# Patient Record
Sex: MALE | Race: WHITE | Employment: FULL TIME | URBAN - NONMETROPOLITAN AREA
[De-identification: names, ages, dates, MRNs, and addresses within clinical notes are randomized per-mention and may not be internally consistent; named-entity substitution may affect disease eponyms.]

---

## 2017-11-24 ENCOUNTER — HOSPITAL ENCOUNTER (OUTPATIENT)
Dept: ULTRASOUND IMAGING | Age: 37
Discharge: HOME | End: 2017-11-24
Payer: COMMERCIAL

## 2017-11-24 DIAGNOSIS — R31.29 OTHER MICROSCOPIC HEMATURIA: ICD-10-CM

## 2017-11-24 PROCEDURE — 76770 US EXAM ABDO BACK WALL COMP: CPT

## 2018-02-09 ENCOUNTER — OFFICE VISIT (OUTPATIENT)
Dept: PODIATRY | Age: 38
End: 2018-02-09
Payer: COMMERCIAL

## 2018-02-09 ENCOUNTER — HOSPITAL ENCOUNTER (OUTPATIENT)
Dept: GENERAL RADIOLOGY | Age: 38
Discharge: HOME | End: 2018-02-11
Payer: COMMERCIAL

## 2018-02-09 VITALS
DIASTOLIC BLOOD PRESSURE: 72 MMHG | HEIGHT: 73 IN | SYSTOLIC BLOOD PRESSURE: 138 MMHG | HEART RATE: 74 BPM | BODY MASS INDEX: 41.75 KG/M2 | WEIGHT: 315 LBS

## 2018-02-09 DIAGNOSIS — M72.2 PLANTAR FASCIITIS OF RIGHT FOOT: ICD-10-CM

## 2018-02-09 DIAGNOSIS — M19.079 INFLAMMATION OF FOOT JOINT: ICD-10-CM

## 2018-02-09 DIAGNOSIS — M72.2 PLANTAR FASCIITIS OF RIGHT FOOT: Primary | ICD-10-CM

## 2018-02-09 PROCEDURE — 99203 OFFICE O/P NEW LOW 30 MIN: CPT | Performed by: PODIATRIST

## 2018-02-09 PROCEDURE — 73630 X-RAY EXAM OF FOOT: CPT

## 2018-02-09 PROCEDURE — L3040 FT ARCH SUPRT PREMOLD LONGIT: HCPCS | Performed by: PODIATRIST

## 2018-02-09 PROCEDURE — 20550 NJX 1 TENDON SHEATH/LIGAMENT: CPT | Performed by: PODIATRIST

## 2018-02-09 RX ORDER — BETAMETHASONE SODIUM PHOSPHATE AND BETAMETHASONE ACETATE 3; 3 MG/ML; MG/ML
6 INJECTION, SUSPENSION INTRA-ARTICULAR; INTRALESIONAL; INTRAMUSCULAR; SOFT TISSUE ONCE
Status: COMPLETED | OUTPATIENT
Start: 2018-02-09 | End: 2018-02-09

## 2018-02-09 RX ADMIN — BETAMETHASONE SODIUM PHOSPHATE AND BETAMETHASONE ACETATE 6 MG: 3; 3 INJECTION, SUSPENSION INTRA-ARTICULAR; INTRALESIONAL; INTRAMUSCULAR; SOFT TISSUE at 08:38

## 2018-02-09 NOTE — PROGRESS NOTES
procedure at age 16    TONSILLECTOMY         Family History   Problem Relation Age of Onset    Arthritis Mother     High Blood Pressure Mother     Arthritis Father     High Blood Pressure Father     High Cholesterol Father     Heart Disease Other     High Blood Pressure Brother     High Blood Pressure Brother     High Blood Pressure Paternal Grandmother     High Cholesterol Paternal Grandmother     Heart Disease Paternal Grandfather     High Blood Pressure Paternal Grandfather     Atrial Fibrillation Maternal Grandmother     Cancer Maternal Grandfather        Social History   Substance Use Topics    Smoking status: Never Smoker    Smokeless tobacco: Current User     Types: Chew    Alcohol use 1.5 oz/week     3 Standard drinks or equivalent per week      Comment: rarely       Review of Systems: All 12 systems reviewed and pertinent positives noted above. Lower Extremity Physical Examination:     Vitals:   Vitals:    02/09/18 0813   BP: (!) 148/70   Pulse: 74     General: AAO x 3 in NAD. Vascular: DP and PT pulses palpable 2/4, bilateral.  CFT <3 seconds, bilateral.  Hair growth present to the level of the digits, bilateral.  Edema absent, bilateral.  Varicosities absent, bilateral. Erythema absent, bilateral. Distal Rubor absent bilateral.  Temperature within normal limits bilateral. Hyperpigmentation absent bilateral. No atrophic skin. Neurological: Sensation intact to light touch to level of digits, bilateral.  Protective sensation intact 10/10 sites via 5.07/10g [place] Monofilament, bilateral.  negative Tinel's, bilateral.  negative Valleix sign, bilateral.  Vibratory intact bilateral.  Reflexes Decreased bilateral.  Paresthesias negative. Dysthesias negative. Sharp/dull intact bilateral.    Musculoskeletal: Muscle strength 5/5, Bilateral.  Pain present upon palpation of medial calcaneal tubercle, Right.   Pain lateral lisfran joinjt to dorsal R 4th metatarsal. within normal limits medial longitudinal arch, Bilateral.  Ankle ROM decreased,Bilateral.  1st MPJ ROM within normal limits, Bilateral.  Dorsally contracted digits absent digits , Bilateral. Other foot deformities none. Integument: Warm, dry, supple, bilateral.  Open lesion absent, bilateral.  Interdigital maceration absent to web spaces bilateral.  Nails within normal limits. Fissures absent, bilateral. Hyperkeratotic tissue is absent. Asessment: Patient is a 40 y.o. male with:   1. Plantar fasciitis of right foot  UT INJECT TENDON SHEATH/LIGAMENT    XR FOOT RIGHT (MIN 3 VIEWS)   2. Inflammation of foot joint  XR FOOT RIGHT (MIN 3 VIEWS)       Plan: Patient examined and evaluated. Current condition and treatment options discussed in detail. Patient was given plantar fasciitis instruction sheet. Patient will start stretching, icing, anti-inflammatory, and arch supports in shoe gear 100% of the time WB. Patient will not go barefoot. Verbal consent obtained from patient for Right heel injection after all questions answered. Right heel palpated medially and most tender location adjacent to the medial calcaneal tubercle identified. This area was prepped with an alcohol swab and 0.5 cc of 1%lidocaine plain and 1 cc of celestone was injected to the Right heel. A band-aid was applied, patient advised of possible local steroid reaction symptoms, and patient instructed to limit activities to this area for 24 hours. Orders Placed This Encounter   Medications    Elastic Bandages & Supports (ANKLE SPLINT/NIGHT AIRFORM) [facility]     Si Device by Does not apply route every evening Plantar fasciitis of right foot  (primary encounter diagnosis)      Dispense posterior night splint.      Dispense:  1 each     Refill:  0     Orders Placed This Encounter   Procedures    XR FOOT RIGHT (MIN 3 VIEWS)     Standing Status:   Future     Standing Expiration Date:   2/10/2019     Scheduling Instructions:      WB    UT INJECT TENDON SHEATH/LIGAMENT       Contact office with any questions/problems/concerns. Return to office in 3week(s).

## 2018-03-02 ENCOUNTER — TELEPHONE (OUTPATIENT)
Dept: PODIATRY | Age: 38
End: 2018-03-02

## 2018-03-30 DIAGNOSIS — M79.673 PAIN OF FOOT, UNSPECIFIED LATERALITY: Primary | ICD-10-CM

## 2018-03-30 PROCEDURE — L3040 FT ARCH SUPRT PREMOLD LONGIT: HCPCS | Performed by: PODIATRIST

## 2020-05-05 ENCOUNTER — HOSPITAL ENCOUNTER (OUTPATIENT)
Age: 40
Discharge: HOME | End: 2020-05-05
Payer: COMMERCIAL

## 2020-05-05 PROCEDURE — 36415 COLL VENOUS BLD VENIPUNCTURE: CPT

## 2020-05-05 PROCEDURE — 84132 ASSAY OF SERUM POTASSIUM: CPT

## 2020-05-06 LAB — POTASSIUM SERPL-SCNC: 3.4 MEQ/L (ref 3.5–5.2)

## 2020-12-07 ENCOUNTER — HOSPITAL ENCOUNTER (OUTPATIENT)
Dept: NON INVASIVE DIAGNOSTICS | Age: 40
Discharge: HOME | End: 2020-12-07
Payer: COMMERCIAL

## 2020-12-07 PROCEDURE — 93320 DOPPLER ECHO COMPLETE: CPT

## 2020-12-07 PROCEDURE — 93303 ECHO TRANSTHORACIC: CPT

## 2020-12-07 PROCEDURE — 93325 DOPPLER ECHO COLOR FLOW MAPG: CPT

## 2021-03-10 ENCOUNTER — HOSPITAL ENCOUNTER (OUTPATIENT)
Age: 41
Discharge: HOME | End: 2021-03-10
Payer: COMMERCIAL

## 2021-03-10 LAB
ANION GAP SERPL CALCULATED.3IONS-SCNC: 10 MEQ/L (ref 8–16)
BUN BLDV-MCNC: 22 MG/DL (ref 7–22)
CALCIUM SERPL-MCNC: 9.1 MG/DL (ref 8.5–10.5)
CHLORIDE BLD-SCNC: 98 MEQ/L (ref 98–111)
CO2: 25 MEQ/L (ref 23–33)
CREAT SERPL-MCNC: 1.4 MG/DL (ref 0.4–1.2)
GFR SERPL CREATININE-BSD FRML MDRD: 56 ML/MIN/1.73M2
GLUCOSE BLD-MCNC: 70 MG/DL (ref 70–108)
POTASSIUM SERPL-SCNC: 4 MEQ/L (ref 3.5–5.2)
SODIUM BLD-SCNC: 133 MEQ/L (ref 135–145)

## 2021-03-10 PROCEDURE — 36415 COLL VENOUS BLD VENIPUNCTURE: CPT

## 2021-03-10 PROCEDURE — 80048 BASIC METABOLIC PNL TOTAL CA: CPT

## 2021-04-12 ENCOUNTER — HOSPITAL ENCOUNTER (OUTPATIENT)
Dept: INTERVENTIONAL RADIOLOGY/VASCULAR | Age: 41
Discharge: HOME | End: 2021-04-12
Payer: COMMERCIAL

## 2021-04-12 ENCOUNTER — APPOINTMENT (OUTPATIENT)
Dept: ULTRASOUND IMAGING | Age: 41
End: 2021-04-12
Payer: COMMERCIAL

## 2021-04-12 DIAGNOSIS — I48.91 ATRIAL FIBRILLATION, UNSPECIFIED TYPE (HCC): ICD-10-CM

## 2021-04-12 DIAGNOSIS — S75.009D: ICD-10-CM

## 2021-04-12 DIAGNOSIS — I15.8 OTHER SECONDARY HYPERTENSION: ICD-10-CM

## 2021-04-12 PROCEDURE — 93925 LOWER EXTREMITY STUDY: CPT

## 2021-06-21 ENCOUNTER — HOSPITAL ENCOUNTER (OUTPATIENT)
Age: 41
Discharge: HOME | End: 2021-06-21
Payer: COMMERCIAL

## 2021-06-21 PROCEDURE — 93005 ELECTROCARDIOGRAM TRACING: CPT | Performed by: NURSE PRACTITIONER

## 2021-06-23 LAB
EKG ATRIAL RATE: 77 BPM
EKG P AXIS: 21 DEGREES
EKG P-R INTERVAL: 210 MS
EKG Q-T INTERVAL: 418 MS
EKG QRS DURATION: 160 MS
EKG QTC CALCULATION (BAZETT): 473 MS
EKG R AXIS: 85 DEGREES
EKG T AXIS: 27 DEGREES
EKG VENTRICULAR RATE: 77 BPM

## 2021-06-23 PROCEDURE — 93010 ELECTROCARDIOGRAM REPORT: CPT | Performed by: INTERNAL MEDICINE

## 2021-07-19 ENCOUNTER — HOSPITAL ENCOUNTER (OUTPATIENT)
Dept: NON INVASIVE DIAGNOSTICS | Age: 41
Discharge: HOME | End: 2021-07-19
Payer: COMMERCIAL

## 2021-07-19 DIAGNOSIS — Q23.1 BICUSPID AORTIC VALVE: ICD-10-CM

## 2021-07-19 DIAGNOSIS — I44.7 LEFT BUNDLE BRANCH BLOCK (LBBB): ICD-10-CM

## 2021-07-19 DIAGNOSIS — Q25.1 COARCTATION OF AORTA (PREDUCTAL) (POSTDUCTAL): ICD-10-CM

## 2021-07-19 DIAGNOSIS — Q23.0 CONGENITAL STENOSIS OF AORTIC VALVE: ICD-10-CM

## 2021-07-19 PROCEDURE — 6360000002 HC RX W HCPCS

## 2021-07-19 PROCEDURE — A9500 TC99M SESTAMIBI: HCPCS | Performed by: NURSE PRACTITIONER

## 2021-07-19 PROCEDURE — 93017 CV STRESS TEST TRACING ONLY: CPT | Performed by: INTERNAL MEDICINE

## 2021-07-19 PROCEDURE — 3430000000 HC RX DIAGNOSTIC RADIOPHARMACEUTICAL: Performed by: NURSE PRACTITIONER

## 2021-07-19 PROCEDURE — 78452 HT MUSCLE IMAGE SPECT MULT: CPT

## 2021-07-19 RX ADMIN — Medication 10.4 MILLICURIE: at 13:07

## 2021-07-19 RX ADMIN — Medication 34.4 MILLICURIE: at 14:09

## 2023-01-02 ENCOUNTER — HOSPITAL ENCOUNTER (INPATIENT)
Age: 43
LOS: 2 days | Discharge: HOME | DRG: 309 | End: 2023-01-04
Attending: EMERGENCY MEDICINE | Admitting: HOSPITALIST
Payer: COMMERCIAL

## 2023-01-02 ENCOUNTER — APPOINTMENT (OUTPATIENT)
Dept: GENERAL RADIOLOGY | Age: 43
DRG: 309 | End: 2023-01-02
Payer: COMMERCIAL

## 2023-01-02 DIAGNOSIS — I48.91 ATRIAL FIBRILLATION WITH RVR (HCC): ICD-10-CM

## 2023-01-02 DIAGNOSIS — E11.9 TYPE 2 DIABETES MELLITUS WITHOUT COMPLICATION, WITHOUT LONG-TERM CURRENT USE OF INSULIN (HCC): ICD-10-CM

## 2023-01-02 DIAGNOSIS — I48.91 ATRIAL FIBRILLATION WITH RAPID VENTRICULAR RESPONSE (HCC): Primary | ICD-10-CM

## 2023-01-02 LAB
ANION GAP SERPL CALCULATED.3IONS-SCNC: 16 MEQ/L (ref 8–16)
AVERAGE GLUCOSE: 213 MG/DL (ref 70–126)
BACTERIA: ABNORMAL /HPF
BASOPHILS # BLD: 0.4 %
BASOPHILS ABSOLUTE: 0 THOU/MM3 (ref 0–0.1)
BETA-HYDROXYBUTYRATE: 1.65 MG/DL (ref 0.2–2.81)
BILIRUBIN URINE: NEGATIVE
BLOOD, URINE: ABNORMAL
BUN BLDV-MCNC: 22 MG/DL (ref 7–22)
CALCIUM SERPL-MCNC: 9 MG/DL (ref 8.5–10.5)
CASTS 2: ABNORMAL /LPF
CASTS UA: ABNORMAL /LPF
CHARACTER, URINE: CLEAR
CHLORIDE BLD-SCNC: 92 MEQ/L (ref 98–111)
CO2: 19 MEQ/L (ref 23–33)
COLOR: YELLOW
CREAT SERPL-MCNC: 1.2 MG/DL (ref 0.4–1.2)
CRYSTALS, UA: ABNORMAL
EKG Q-T INTERVAL: 330 MS
EKG QRS DURATION: 142 MS
EKG QTC CALCULATION (BAZETT): 510 MS
EKG R AXIS: 93 DEGREES
EKG T AXIS: -64 DEGREES
EKG VENTRICULAR RATE: 144 BPM
EOSINOPHIL # BLD: 1 %
EOSINOPHILS ABSOLUTE: 0.1 THOU/MM3 (ref 0–0.4)
EPITHELIAL CELLS, UA: ABNORMAL /HPF
ERYTHROCYTE [DISTWIDTH] IN BLOOD BY AUTOMATED COUNT: 13.2 % (ref 11.5–14.5)
ERYTHROCYTE [DISTWIDTH] IN BLOOD BY AUTOMATED COUNT: 42.1 FL (ref 35–45)
GFR SERPL CREATININE-BSD FRML MDRD: > 60 ML/MIN/1.73M2
GLUCOSE BLD-MCNC: 268 MG/DL (ref 70–108)
GLUCOSE BLD-MCNC: 272 MG/DL (ref 70–108)
GLUCOSE BLD-MCNC: 447 MG/DL (ref 70–108)
GLUCOSE URINE: >= 1000 MG/DL
HBA1C MFR BLD: 9.1 % (ref 4.4–6.4)
HCT VFR BLD CALC: 40.1 % (ref 42–52)
HEMOGLOBIN: 14 GM/DL (ref 14–18)
IMMATURE GRANS (ABS): 0.05 THOU/MM3 (ref 0–0.07)
IMMATURE GRANULOCYTES: 0.4 %
INFLUENZA A: NOT DETECTED
INFLUENZA B: NOT DETECTED
INR BLD: 1.24 (ref 0.85–1.13)
KETONES, URINE: NEGATIVE
LEUKOCYTE ESTERASE, URINE: NEGATIVE
LYMPHOCYTES # BLD: 9.8 %
LYMPHOCYTES ABSOLUTE: 1.2 THOU/MM3 (ref 1–4.8)
MAGNESIUM: 1.7 MG/DL (ref 1.6–2.4)
MCH RBC QN AUTO: 30.8 PG (ref 26–33)
MCHC RBC AUTO-ENTMCNC: 34.9 GM/DL (ref 32.2–35.5)
MCV RBC AUTO: 88.1 FL (ref 80–94)
MISCELLANEOUS 2: ABNORMAL
MONOCYTES # BLD: 4.7 %
MONOCYTES ABSOLUTE: 0.6 THOU/MM3 (ref 0.4–1.3)
NITRITE, URINE: NEGATIVE
NUCLEATED RED BLOOD CELLS: 0 /100 WBC
OSMOLALITY CALCULATION: 277.9 MOSMOL/KG (ref 275–300)
PH UA: 5.5 (ref 5–9)
PLATELET # BLD: 273 THOU/MM3 (ref 130–400)
PMV BLD AUTO: 9.3 FL (ref 9.4–12.4)
POTASSIUM SERPL-SCNC: 4.7 MEQ/L (ref 3.5–5.2)
PRO-BNP: 3137 PG/ML (ref 0–124)
PROCALCITONIN: 0.16 NG/ML (ref 0.01–0.09)
PROTEIN UA: NEGATIVE
RBC # BLD: 4.55 MILL/MM3 (ref 4.7–6.1)
RBC URINE: ABNORMAL /HPF
RENAL EPITHELIAL, UA: ABNORMAL
SARS-COV-2 RNA, RT PCR: NOT DETECTED
SEG NEUTROPHILS: 83.7 %
SEGMENTED NEUTROPHILS ABSOLUTE COUNT: 10 THOU/MM3 (ref 1.8–7.7)
SODIUM BLD-SCNC: 127 MEQ/L (ref 135–145)
SPECIFIC GRAVITY, URINE: 1.02 (ref 1–1.03)
TROPONIN T: < 0.01 NG/ML
TSH SERPL DL<=0.05 MIU/L-ACNC: 1.65 UIU/ML (ref 0.4–4.2)
UROBILINOGEN, URINE: 0.2 EU/DL (ref 0–1)
WBC # BLD: 11.9 THOU/MM3 (ref 4.8–10.8)
WBC UA: ABNORMAL /HPF
YEAST: ABNORMAL

## 2023-01-02 PROCEDURE — 96361 HYDRATE IV INFUSION ADD-ON: CPT

## 2023-01-02 PROCEDURE — 84443 ASSAY THYROID STIM HORMONE: CPT

## 2023-01-02 PROCEDURE — 2140000000 HC CCU INTERMEDIATE R&B

## 2023-01-02 PROCEDURE — 83735 ASSAY OF MAGNESIUM: CPT

## 2023-01-02 PROCEDURE — 87636 SARSCOV2 & INF A&B AMP PRB: CPT

## 2023-01-02 PROCEDURE — 2500000003 HC RX 250 WO HCPCS: Performed by: STUDENT IN AN ORGANIZED HEALTH CARE EDUCATION/TRAINING PROGRAM

## 2023-01-02 PROCEDURE — 84145 PROCALCITONIN (PCT): CPT

## 2023-01-02 PROCEDURE — 93005 ELECTROCARDIOGRAM TRACING: CPT | Performed by: STUDENT IN AN ORGANIZED HEALTH CARE EDUCATION/TRAINING PROGRAM

## 2023-01-02 PROCEDURE — 6370000000 HC RX 637 (ALT 250 FOR IP)

## 2023-01-02 PROCEDURE — 80048 BASIC METABOLIC PNL TOTAL CA: CPT

## 2023-01-02 PROCEDURE — 84484 ASSAY OF TROPONIN QUANT: CPT

## 2023-01-02 PROCEDURE — 2580000003 HC RX 258: Performed by: STUDENT IN AN ORGANIZED HEALTH CARE EDUCATION/TRAINING PROGRAM

## 2023-01-02 PROCEDURE — 82010 KETONE BODYS QUAN: CPT

## 2023-01-02 PROCEDURE — 71046 X-RAY EXAM CHEST 2 VIEWS: CPT

## 2023-01-02 PROCEDURE — 99285 EMERGENCY DEPT VISIT HI MDM: CPT

## 2023-01-02 PROCEDURE — 82948 REAGENT STRIP/BLOOD GLUCOSE: CPT

## 2023-01-02 PROCEDURE — 83036 HEMOGLOBIN GLYCOSYLATED A1C: CPT

## 2023-01-02 PROCEDURE — 81001 URINALYSIS AUTO W/SCOPE: CPT

## 2023-01-02 PROCEDURE — 85025 COMPLETE CBC W/AUTO DIFF WBC: CPT

## 2023-01-02 PROCEDURE — 6360000002 HC RX W HCPCS

## 2023-01-02 PROCEDURE — 2580000003 HC RX 258

## 2023-01-02 PROCEDURE — 96365 THER/PROPH/DIAG IV INF INIT: CPT

## 2023-01-02 PROCEDURE — 85610 PROTHROMBIN TIME: CPT

## 2023-01-02 PROCEDURE — 6370000000 HC RX 637 (ALT 250 FOR IP): Performed by: PHYSICIAN ASSISTANT

## 2023-01-02 PROCEDURE — 36415 COLL VENOUS BLD VENIPUNCTURE: CPT

## 2023-01-02 PROCEDURE — 96376 TX/PRO/DX INJ SAME DRUG ADON: CPT

## 2023-01-02 PROCEDURE — 83880 ASSAY OF NATRIURETIC PEPTIDE: CPT

## 2023-01-02 RX ORDER — SODIUM CHLORIDE 0.9 % (FLUSH) 0.9 %
5-40 SYRINGE (ML) INJECTION EVERY 12 HOURS SCHEDULED
Status: DISCONTINUED | OUTPATIENT
Start: 2023-01-02 | End: 2023-01-04 | Stop reason: HOSPADM

## 2023-01-02 RX ORDER — INSULIN LISPRO 100 [IU]/ML
0-8 INJECTION, SOLUTION INTRAVENOUS; SUBCUTANEOUS
Status: DISCONTINUED | OUTPATIENT
Start: 2023-01-02 | End: 2023-01-04 | Stop reason: HOSPADM

## 2023-01-02 RX ORDER — DILTIAZEM HYDROCHLORIDE 5 MG/ML
10 INJECTION INTRAVENOUS ONCE
Status: COMPLETED | OUTPATIENT
Start: 2023-01-02 | End: 2023-01-02

## 2023-01-02 RX ORDER — POTASSIUM CHLORIDE 20 MEQ/1
40 TABLET, EXTENDED RELEASE ORAL PRN
Status: DISCONTINUED | OUTPATIENT
Start: 2023-01-02 | End: 2023-01-04 | Stop reason: HOSPADM

## 2023-01-02 RX ORDER — METOPROLOL SUCCINATE 100 MG/1
100 TABLET, EXTENDED RELEASE ORAL 2 TIMES DAILY
Status: DISCONTINUED | OUTPATIENT
Start: 2023-01-02 | End: 2023-01-03

## 2023-01-02 RX ORDER — ACETAMINOPHEN 650 MG/1
650 SUPPOSITORY RECTAL EVERY 6 HOURS PRN
Status: DISCONTINUED | OUTPATIENT
Start: 2023-01-02 | End: 2023-01-04 | Stop reason: HOSPADM

## 2023-01-02 RX ORDER — ONDANSETRON 4 MG/1
4 TABLET, ORALLY DISINTEGRATING ORAL EVERY 8 HOURS PRN
Status: DISCONTINUED | OUTPATIENT
Start: 2023-01-02 | End: 2023-01-04 | Stop reason: HOSPADM

## 2023-01-02 RX ORDER — SODIUM CHLORIDE 9 MG/ML
INJECTION, SOLUTION INTRAVENOUS CONTINUOUS
Status: ACTIVE | OUTPATIENT
Start: 2023-01-02 | End: 2023-01-03

## 2023-01-02 RX ORDER — INSULIN LISPRO 100 [IU]/ML
0-4 INJECTION, SOLUTION INTRAVENOUS; SUBCUTANEOUS NIGHTLY
Status: DISCONTINUED | OUTPATIENT
Start: 2023-01-02 | End: 2023-01-02

## 2023-01-02 RX ORDER — ACETAMINOPHEN 325 MG/1
650 TABLET ORAL EVERY 6 HOURS PRN
Status: DISCONTINUED | OUTPATIENT
Start: 2023-01-02 | End: 2023-01-04 | Stop reason: HOSPADM

## 2023-01-02 RX ORDER — LISINOPRIL 40 MG/1
40 TABLET ORAL DAILY
Status: CANCELLED | OUTPATIENT
Start: 2023-01-02

## 2023-01-02 RX ORDER — LISINOPRIL 40 MG/1
40 TABLET ORAL DAILY
Status: DISCONTINUED | OUTPATIENT
Start: 2023-01-02 | End: 2023-01-04 | Stop reason: HOSPADM

## 2023-01-02 RX ORDER — SPIRONOLACTONE 25 MG/1
25 TABLET ORAL DAILY
Status: DISCONTINUED | OUTPATIENT
Start: 2023-01-02 | End: 2023-01-04 | Stop reason: HOSPADM

## 2023-01-02 RX ORDER — SPIRONOLACTONE 25 MG/1
25 TABLET ORAL DAILY
COMMUNITY

## 2023-01-02 RX ORDER — INSULIN LISPRO 100 [IU]/ML
0-4 INJECTION, SOLUTION INTRAVENOUS; SUBCUTANEOUS NIGHTLY
Status: DISCONTINUED | OUTPATIENT
Start: 2023-01-02 | End: 2023-01-04 | Stop reason: HOSPADM

## 2023-01-02 RX ORDER — INSULIN LISPRO 100 [IU]/ML
0-4 INJECTION, SOLUTION INTRAVENOUS; SUBCUTANEOUS
Status: DISCONTINUED | OUTPATIENT
Start: 2023-01-02 | End: 2023-01-02

## 2023-01-02 RX ORDER — MAGNESIUM SULFATE 1 G/100ML
1000 INJECTION INTRAVENOUS ONCE
Status: COMPLETED | OUTPATIENT
Start: 2023-01-02 | End: 2023-01-02

## 2023-01-02 RX ORDER — SODIUM CHLORIDE 9 MG/ML
INJECTION, SOLUTION INTRAVENOUS PRN
Status: DISCONTINUED | OUTPATIENT
Start: 2023-01-02 | End: 2023-01-04 | Stop reason: HOSPADM

## 2023-01-02 RX ORDER — 0.9 % SODIUM CHLORIDE 0.9 %
1000 INTRAVENOUS SOLUTION INTRAVENOUS ONCE
Status: COMPLETED | OUTPATIENT
Start: 2023-01-02 | End: 2023-01-02

## 2023-01-02 RX ORDER — POTASSIUM CHLORIDE 7.45 MG/ML
10 INJECTION INTRAVENOUS PRN
Status: DISCONTINUED | OUTPATIENT
Start: 2023-01-02 | End: 2023-01-04 | Stop reason: HOSPADM

## 2023-01-02 RX ORDER — SPIRONOLACTONE 25 MG/1
25 TABLET ORAL DAILY
Status: CANCELLED | OUTPATIENT
Start: 2023-01-02

## 2023-01-02 RX ORDER — ONDANSETRON 2 MG/ML
4 INJECTION INTRAMUSCULAR; INTRAVENOUS EVERY 6 HOURS PRN
Status: DISCONTINUED | OUTPATIENT
Start: 2023-01-02 | End: 2023-01-04 | Stop reason: HOSPADM

## 2023-01-02 RX ORDER — ASPIRIN 81 MG/1
81 TABLET ORAL DAILY
Status: DISCONTINUED | OUTPATIENT
Start: 2023-01-03 | End: 2023-01-04 | Stop reason: HOSPADM

## 2023-01-02 RX ORDER — MAGNESIUM SULFATE IN WATER 40 MG/ML
2000 INJECTION, SOLUTION INTRAVENOUS PRN
Status: DISCONTINUED | OUTPATIENT
Start: 2023-01-02 | End: 2023-01-04 | Stop reason: HOSPADM

## 2023-01-02 RX ORDER — INSULIN GLARGINE 100 [IU]/ML
10 INJECTION, SOLUTION SUBCUTANEOUS NIGHTLY
Status: DISCONTINUED | OUTPATIENT
Start: 2023-01-02 | End: 2023-01-04 | Stop reason: HOSPADM

## 2023-01-02 RX ORDER — DEXTROSE MONOHYDRATE 100 MG/ML
INJECTION, SOLUTION INTRAVENOUS CONTINUOUS PRN
Status: DISCONTINUED | OUTPATIENT
Start: 2023-01-02 | End: 2023-01-04 | Stop reason: HOSPADM

## 2023-01-02 RX ORDER — METOPROLOL SUCCINATE 100 MG/1
100 TABLET, EXTENDED RELEASE ORAL 2 TIMES DAILY
Status: CANCELLED | OUTPATIENT
Start: 2023-01-02

## 2023-01-02 RX ORDER — POLYETHYLENE GLYCOL 3350 17 G/17G
17 POWDER, FOR SOLUTION ORAL DAILY PRN
Status: DISCONTINUED | OUTPATIENT
Start: 2023-01-02 | End: 2023-01-04 | Stop reason: HOSPADM

## 2023-01-02 RX ORDER — SODIUM CHLORIDE 0.9 % (FLUSH) 0.9 %
5-40 SYRINGE (ML) INJECTION PRN
Status: DISCONTINUED | OUTPATIENT
Start: 2023-01-02 | End: 2023-01-04 | Stop reason: HOSPADM

## 2023-01-02 RX ADMIN — DILTIAZEM HYDROCHLORIDE 10 MG: 5 INJECTION INTRAVENOUS at 12:59

## 2023-01-02 RX ADMIN — DILTIAZEM HYDROCHLORIDE 5 MG/HR: 5 INJECTION, SOLUTION INTRAVENOUS at 13:04

## 2023-01-02 RX ADMIN — DILTIAZEM HYDROCHLORIDE 30 MG: 30 TABLET, FILM COATED ORAL at 18:53

## 2023-01-02 RX ADMIN — SODIUM CHLORIDE 1000 ML: 9 INJECTION, SOLUTION INTRAVENOUS at 11:14

## 2023-01-02 RX ADMIN — INSULIN GLARGINE 10 UNITS: 100 INJECTION, SOLUTION SUBCUTANEOUS at 21:17

## 2023-01-02 RX ADMIN — INSULIN LISPRO 4 UNITS: 100 INJECTION, SOLUTION INTRAVENOUS; SUBCUTANEOUS at 18:53

## 2023-01-02 RX ADMIN — SODIUM CHLORIDE: 9 INJECTION, SOLUTION INTRAVENOUS at 14:54

## 2023-01-02 RX ADMIN — DILTIAZEM HYDROCHLORIDE 15 MG/HR: 5 INJECTION, SOLUTION INTRAVENOUS at 22:33

## 2023-01-02 RX ADMIN — APIXABAN 5 MG: 5 TABLET, FILM COATED ORAL at 19:49

## 2023-01-02 RX ADMIN — METOPROLOL SUCCINATE 100 MG: 100 TABLET, EXTENDED RELEASE ORAL at 19:48

## 2023-01-02 RX ADMIN — DILTIAZEM HYDROCHLORIDE 10 MG: 5 INJECTION INTRAVENOUS at 11:14

## 2023-01-02 RX ADMIN — MAGNESIUM SULFATE HEPTAHYDRATE 1000 MG: 1 INJECTION, SOLUTION INTRAVENOUS at 19:46

## 2023-01-02 ASSESSMENT — HEART SCORE: ECG: 1

## 2023-01-02 ASSESSMENT — PAIN - FUNCTIONAL ASSESSMENT
PAIN_FUNCTIONAL_ASSESSMENT: NONE - DENIES PAIN

## 2023-01-02 NOTE — ED PROVIDER NOTES
I performed a history and physical examination of the patient and discussed management with the resident. I reviewed the residents note and agree with the documented findings and plan of care. Any areas of disagreement are noted on the chart. I was personally present for the key portions of any procedures. I have documented in the chart those procedures where I was not present during the key portions. I have reviewed the emergency nurses triage note. I agree with the chief complaint, past medical history, past surgical history, allergies, medications, social and family history as documented unless otherwise noted below. Documentation of the HPI, Physical Exam and Medical Decision Making performed by medical students or scribes is based on my personal performance of the HPI, PE and MDM. For Phys Assistant/ Nurse Practitioner cases/documentation I have personally evaluated this patient and have completed at least one if not all key elements of the E/M (history, physical exam, and MDM). My findings are as noted below     Patient presents today for tachycardia with heart rate. He stated he felt fatigued yesterday. Patient does have a history of atrial fibrillation. He was started on Eliquis yesterday. Patient stated he had some minor chest pain yesterday had cold sweats. Today he denies any chest pain or shortness of breath. Atrial fibrillation rapid ventricular response right axis deviation, left bundle branch block, ventricular rate of 144 NV interval indeterminate QRS duration 142 QT interval 330 QTC of 510. XR CHEST (2 VW)   Final Result   1. Stable cardiomegaly. 2. No acute findings. **This report has been created using voice recognition software. It may contain minor errors which are inherent in voice recognition technology. **      Final report electronically signed by Dr. Madison Zhou on 1/2/2023 11:54 AM        Labs Reviewed   CBC WITH AUTO DIFFERENTIAL - Abnormal; Notable for the following components:       Result Value    WBC 11.9 (*)     RBC 4.55 (*)     Hematocrit 40.1 (*)     MPV 9.3 (*)     Segs Absolute 10.0 (*)     All other components within normal limits   BASIC METABOLIC PANEL - Abnormal; Notable for the following components:    Sodium 127 (*)     Chloride 92 (*)     CO2 19 (*)     Glucose 447 (*)     All other components within normal limits   BRAIN NATRIURETIC PEPTIDE - Abnormal; Notable for the following components:    Pro-BNP 3137.0 (*)     All other components within normal limits   PROTIME-INR - Abnormal; Notable for the following components:    INR 1.24 (*)     All other components within normal limits   PROCALCITONIN - Abnormal; Notable for the following components:    Procalcitonin 0.16 (*)     All other components within normal limits   URINE WITH REFLEXED MICRO - Abnormal; Notable for the following components:    Glucose, Ur >= 1000 (*)     Blood, Urine SMALL (*)     All other components within normal limits   HEMOGLOBIN A1C - Abnormal; Notable for the following components:    Hemoglobin A1C 9.1 (*)     AVERAGE GLUCOSE 213 (*)     All other components within normal limits   POCT GLUCOSE - Abnormal; Notable for the following components:    POC Glucose 272 (*)     All other components within normal limits   COVID-19 & INFLUENZA COMBO   TSH   MAGNESIUM   TROPONIN   ANION GAP   OSMOLALITY   GLOMERULAR FILTRATION RATE, ESTIMATED   BETA-HYDROXYBUTYRATE   CBC WITH AUTO DIFFERENTIAL   BASIC METABOLIC PANEL W/ REFLEX TO MG FOR LOW K   MAGNESIUM   POCT GLUCOSE   POCT GLUCOSE         Final diagnoses:   Atrial fibrillation with rapid ventricular response (HCC)   Type 2 diabetes mellitus without complication, without long-term current use of insulin ([facility] [address]   . I have seen this patient with Dr. Landen Marino the resident and agree with her assessment and plan.      Cassidy Hilario DO  01/02/23 2003

## 2023-01-02 NOTE — H&P
Hospitalist History and Physical          Patient: Brooks Pineda  : 1980  MRN: [id number]     Acct: [de-identified]    PCP: ELENA Grant CNP  Date of Admission: 2023  Date of Service: Pt seen/examined on 23  and Admitted to Inpatient with expected LOS greater than two midnights due to medical therapy. Hospital Problems             Last Modified POA    * (Principal) Atrial fibrillation with RVR ([address] 2023 Yes       Assessment and Plan:    Paroxysmal Atrial fibrillation with RVR:   Patient is s/p ablation 2 years ago. Anticoagulated on Eliquis. EKG showed A-fib with RVR. Started on diltiazem gtt in ED, continued. On metoprolol and diltiazem 30mg BID at home. Will resume his home metoprolol and add po diltiazem 30mg Q6h, wean gtt off as able. Keep K > 4, Mg >2   Giving 1 g mag sulfate now. Hyponatremia, mild: 127. When corrected for BG of 447 Na is 133. Patient already received 1 L bolus. Will start gentle hydration. Hypertension: Patient currently is currently on the softer side at this time. Will resume his rate controlling medications as above. Hold lisinopril/hctz, resume when appropriate. NIDDMII: Newly diagnosed  HGB A1C: 9.6. ,   Will start Lantus 10U nightly plus medium dose SSI. Monitor insulin requirements and adjust as indicated. Hypoglycemia protocol, carb control diet, consult placed for dietician for diabetes education. Non-anion gap metabolic acidosis: Currently bicarb is only 19. Received IVF bolus, continuing gentle hydration. Repeat BMP in AM.    H/o AS, s/p Ross procedure . 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  Follows with [facility] in outpatient setting.        =======================================================================      Chief Complaint:  Lethargy/fast heart rate    History Of Present Illness:  Brooks Pineda is a 43 y.o. male with PMHx of Paroxysmal A-Fib, hypertension, and heart murmur who presents to [facility] with lethargy/fast heart rate. Patient stated that his heart rate was noted to be elevated yesterday, which this has occurred in the past with spontaneous resolution. He notified his sister, whom works with cardiology group with [facility], that helped him get a prescription for eliquis in case the A-fib continued. He also mentioned several heart surgeries growing up with his last one occurring at the age of 16. He denies chest pain, shortness of breath, fever, chills, rigors and having any sick contacts. He did endorse having sweating episodes yesterday, but not so bad today. ED course: While in the ED he received a 1 L bolus of NS, EKG performed showed A-fib with RVR. Cardizem drip was initiated and blood pressure has been maintaining. Past Medical History:        Diagnosis Date    Atrial fibrillation with RVR ([facility] [address]     Coarctation of aorta 1980    surgery at birth for this    H/O Ross procedure 1997    Heart murmur     History of blood transfusion     Hypertension     Seizures ([address]        Past Surgical History:        Procedure Laterality Date    ADENOIDECTOMY      CARDIAC CATHETERIZATION      2 caths    CARDIAC SURGERY  1980    aortic stenosis & coarctation repair    CARDIAC VALVE REPLACEMENT  1997    ross procedure at age 16    TONSILLECTOMY         Medications Prior to Admission:   Prior to Admission medications    Medication Sig Start Date End Date Taking?  Authorizing Provider   apixaban (ELIQUIS) 5 MG TABS tablet Take 5 mg by mouth 2 times daily   Yes Historical Provider, MD   dilTIAZem (CARDIZEM) 30 MG tablet Take 30 mg by mouth 2 times daily   Yes Historical Provider, MD   spironolactone (ALDACTONE) 25 MG tablet Take 25 mg by mouth daily   Yes Historical Provider, MD   Elastic Bandages & Supports (ANKLE SPLINT/NIGHT AIRFORM) MISC 1 Device by Does not apply route every evening Plantar fasciitis of right foot  (primary encounter diagnosis)      Dispense posterior night splint. 2/9/18   Ebenezer Garcia DPM   metoprolol (TOPROL-XL) 100 MG XL tablet Take 100 mg by mouth 2 times daily    Historical Provider, MD   lisinopril (PRINIVIL;ZESTRIL) 40 MG tablet Take 40 mg by mouth daily    Historical Provider, MD   aspirin 81 MG tablet Take 81 mg by mouth daily    Historical Provider, MD       Allergies:  Patient has no known allergies. Social History:    The patient currently lives at home. Tobacco use:   reports that he has never smoked. His smokeless tobacco use includes chew. Alcohol use:   reports current alcohol use of about 2.5 standard drinks per week. Drug use:  reports no history of drug use. Family History:   as follows:      Problem Relation Age of Onset    Arthritis Mother     High Blood Pressure Mother     Arthritis Father     High Blood Pressure Father     High Cholesterol Father     Heart Disease Other     High Blood Pressure Brother     High Blood Pressure Brother     High Blood Pressure Paternal Grandmother     High Cholesterol Paternal Grandmother     Heart Disease Paternal Grandfather     High Blood Pressure Paternal Grandfather     Atrial Fibrillation Maternal Grandmother     Cancer Maternal Grandfather        Review of Systems:   Pertinent positives and negatives as noted in the HPI. Otherwise complete ROS negative. Physical Exam:    BP 92/60   Pulse (!) 133   Temp 98 °F (36.7 °C)   Resp 16   Ht 6' 1\" (1.854 m)   Wt (!) 340 lb (154.2 kg)   SpO2 98%   BMI 44.86 kg/m²       General appearance: No apparent distress, appears stated age. Eyes:  Pupils equal, round, and reactive to light. Conjunctivae/corneas clear. HENT: Head normal in appearance. External nares normal.  Oral mucosa moist without lesions. Hearing grossly intact. Neck: Supple, with full range of motion. Trachea midline. No gross JVD appreciated. Respiratory:  Normal respiratory effort.  Clear to auscultation, bilaterally without rales or wheezes or rhonchi. Cardiovascular: Irregularly irregular rhythm with Z7/N7 systolic murmur. No lower extremity edema. Abdomen: Soft, non-tender, non-distended with normal bowel sounds. Musculoskeletal: No joint swelling or tenderness. Normal tone. No abnormal movements. Skin: Warm and dry. No rashes or lesions. Neurologic:  No focal sensory/motor deficits in the upper and lower extremities. Cranial nerves:  grossly non-focal 2-12. Psychiatric: Alert and oriented, normal insight and thought content. Capillary Refill: Brisk,< 3 seconds. Peripheral Pulses: +2 palpable, equal bilaterally. Labs:     Recent Labs     01/02/23  1100   WBC 11.9*   HGB 14.0   HCT 40.1*        Recent Labs     01/02/23  1100   *   K 4.7   CL 92*   CO2 19*   BUN 22   CREATININE 1.2   CALCIUM 9.0     No results for input(s): AST, ALT, BILIDIR, BILITOT, ALKPHOS in the last 72 hours. Recent Labs     01/02/23  1100   INR 1.24*     No results for input(s): Magdalene Cohen in the last 72 hours. Lab Results   Component Value Date/Time    NITRU NEGATIVE 01/02/2023 12:30 PM    WBCUA NONE SEEN 01/02/2023 12:30 PM    BACTERIA NONE SEEN 01/02/2023 12:30 PM    RBCUA 0-2 01/02/2023 12:30 PM    BLOODU SMALL 01/02/2023 12:30 PM    GLUCOSEU >= 1000 01/02/2023 12:30 PM         Radiology:     XR CHEST (2 VW)   Final Result   1. Stable cardiomegaly. 2. No acute findings. **This report has been created using voice recognition software. It may contain minor errors which are inherent in voice recognition technology. **      Final report electronically signed by Dr. Loreta Fonseca on 1/2/2023 11:54 AM             EKG:  I have reviewed the EKG with the following interpretation: A-fib with RVR      PT/OT Eval Status:  will be assessed  Diet: Carb control 5 carbs/meal  DVT prophylaxis: Currently on medication  Code Status: FC  Disposition: admit to Stepdown     Thank you Roberto Bowles, APRN - STACY for the opportunity to be involved in this patient's care.     Electronically signed by ELENA Truong CNP on 1/2/2023 at 1:54 PM.

## 2023-01-02 NOTE — ED NOTES
ED to inpatient nurses report    Chief Complaint   Patient presents with    Tachycardia    Chest Pain      Present to ED from home  LOC: alert and orientated to name, place, date  Vital signs   Vitals:    01/02/23 1207 01/02/23 1304 01/02/23 1411 01/02/23 1452   BP: (!) 111/57 92/60 100/62 95/60   Pulse: (!) 140 (!) 133 (!) 145 (!) 141   Resp: 18 16 16 16   Temp:       SpO2: 100% 98% 98% 98%   Weight:       Height:          Oxygen Baseline 98%    Current needs required RA Bipap/Cpap No  LDAs:   Peripheral IV 01/02/23 Right Forearm (Active)   Site Assessment Clean, dry & intact 01/02/23 1111   Line Status Blood return noted; Flushed;Specimen collected 01/02/23 1111   Phlebitis Assessment No symptoms 01/02/23 1111   Infiltration Assessment 0 01/02/23 1111   Dressing Status Clean, dry & intact 01/02/23 1111     Mobility: Independent  Pending ED orders: NA  Present condition: stable    C-SSRS Risk of Suicide: No Risk  Swallow Screening    Preferred Language: [place]     Electronically signed by Francisco Westfall RN on 1/2/2023 at 3:35 PM       Francisco Westfall RN  01/02/23 4622

## 2023-01-02 NOTE — FLOWSHEET NOTE
Patient arrived per cart to 3B. Heart monitor applied and vitals taken. Admission paperwork completed. Explained to patient that [facility] is not responsible for any lost or stolen items. Patient verbalized understanding. Oriented to room and use of call light and bed controls. Patient denies pain or needs. No signs of distress noted. Bed locked & in low position, side-rails up x2. Call light in reach. Reminded patient to call nurse if any needs arise. 2 person skin assessment performed by this nurse and Garcia Lyons RN. Explained patients right to have family, representative or physician notified of their admission. Patient has Declined for physician to be notified. Patient has Declined for family/representative to be notified.

## 2023-01-02 NOTE — ED NOTES
Pt transported to [facility]. Pt in stable condition. Floor contacted before transport, spoke to isac.       Efe Savage  01/02/23 1933

## 2023-01-02 NOTE — ED PROVIDER NOTES
[address] [facility] [address] [address] EMERGENCY DEPT      EMERGENCY MEDICINE     Pt Name: Tammy Philip  MRN: [id number]  [place] 1980  Date of evaluation: 1/2/2023  Provider: Kevin Marquez MD  Supervising Physician: Rancho Warren       Chief Complaint   Patient presents with    Tachycardia    Chest Pain     History obtained from the patient. HISTORY OF PRESENT ILLNESS   Tammy Philip is a pleasant 43 y.o. male who presents to the emergency department from from home, by private vehicle for evaluation of tachycardia. 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 fib status post cardioversion and ablation 2 years ago, started on Eliquis yesterday, presenting with concern for recurrent A. fib. States that yesterday he was sitting his chair, noticed that he was sweating, checked his heart rate it was elevated, he felt that this likely was A. fib. Has not had any chest pain or shortness of breath or leg swelling. Patient is chronically on several medications managed by different physicians, had been started on spironolactone to improve his potassium, however stopped taking hydrochlorothiazide since then, still persisting spironolactone. His cardiologist is at [facility], since his previous history of congenital heart disease. Has not established a local cardiologist.  Denies any dizziness, change in vision, fever, chills.     PASTMEDICAL HISTORY     Past Medical History:   Diagnosis Date    Atrial fibrillation with RVR ([address]     Coarctation of aorta 1980    surgery at birth for this    H/O Ross procedure 1997    Heart murmur     History of blood transfusion     Hypertension     Seizures [facility])        Patient Active Problem List   Diagnosis Code    Atrial fibrillation with rapid ventricular response ([address] I48.91    Persistent atrial fibrillation ([address] I48.19     SURGICAL HISTORY       Past Surgical History: Procedure Laterality Date    ADENOIDECTOMY      CARDIAC CATHETERIZATION      2 caths    CARDIAC SURGERY  1980    aortic stenosis & coarctation repair    CARDIAC VALVE REPLACEMENT  1997    ross procedure at age 16    TONSILLECTOMY         CURRENT MEDICATIONS       Previous Medications    APIXABAN (ELIQUIS) 5 MG TABS TABLET    Take 5 mg by mouth 2 times daily    ASPIRIN 81 MG TABLET    Take 81 mg by mouth daily    DILTIAZEM (CARDIZEM) 30 MG TABLET    Take 30 mg by mouth 2 times daily    ELASTIC BANDAGES & SUPPORTS (ANKLE SPLINT/NIGHT AIRFORM) MISC    1 Device by Does not apply route every evening Plantar fasciitis of right foot  (primary encounter diagnosis)      Dispense posterior night splint. LISINOPRIL (PRINIVIL;ZESTRIL) 40 MG TABLET    Take 40 mg by mouth daily    METOPROLOL (TOPROL-XL) 100 MG XL TABLET    Take 100 mg by mouth 2 times daily    SPIRONOLACTONE (ALDACTONE) 25 MG TABLET    Take 25 mg by mouth daily       ALLERGIES     has No Known Allergies. FAMILY HISTORY     He indicated that his mother is alive. He indicated that his father is alive. He indicated that both of his brothers are alive. He indicated that the status of his maternal grandmother is unknown. He indicated that the status of his maternal grandfather is unknown. He indicated that the status of his paternal grandmother is unknown. He indicated that the status of his paternal grandfather is unknown. He indicated that his other is alive. SOCIAL HISTORY       Social History     Tobacco Use    Smoking status: Never    Smokeless tobacco: Current     Types: Chew   Substance Use Topics    Alcohol use:  Yes     Alcohol/week: 2.5 standard drinks     Types: 3 Standard drinks or equivalent per week     Comment: rarely    Drug use: No       PHYSICAL EXAM       ED Triage Vitals [01/02/23 1056]   BP Temp Temp src Heart Rate Resp SpO2 Height Weight   114/81 98 °F (36.7 °C) -- (!) 151 18 97 % 6' 1\" (1.854 m) (!) 340 lb (154.2 kg) Additional Vital Signs:  Vitals:    01/02/23 1304   BP: 92/60   Pulse: (!) 133   Resp: 16   Temp:    SpO2: 98%     Physical Exam  Constitutional:       General: He is not in acute distress. Appearance: Normal appearance. He is obese. He is not ill-appearing or diaphoretic. HENT:      Head: Normocephalic and atraumatic. Right Ear: External ear normal.      Left Ear: External ear normal.      Nose: Nose normal.      Mouth/Throat:      Mouth: Mucous membranes are moist.      Pharynx: Oropharynx is clear. Eyes:      Conjunctiva/sclera: Conjunctivae normal.      Pupils: Pupils are equal, round, and reactive to light. Cardiovascular:      Rate and Rhythm: Normal rate and regular rhythm. Pulses: Normal pulses. Heart sounds: Normal heart sounds. Pulmonary:      Effort: Pulmonary effort is normal. No respiratory distress. Breath sounds: Normal breath sounds. No rales. Abdominal:      General: Abdomen is flat. Bowel sounds are normal. There is distension. Palpations: Abdomen is soft. Musculoskeletal:         General: No tenderness. Right lower leg: No edema. Left lower leg: No edema. Skin:     General: Skin is warm and dry. Capillary Refill: Capillary refill takes less than 2 seconds. Coloration: Skin is not pale. Neurological:      General: No focal deficit present. Mental Status: He is alert and oriented to person, place, and time. Gait: Gait normal.       FORMAL DIAGNOSTIC RESULTS     RADIOLOGY: Interpretation per the Radiologist below, if available at the time of this note (none if blank):    XR CHEST (2 VW)   Final Result   1. Stable cardiomegaly. 2. No acute findings. **This report has been created using voice recognition software. It may contain minor errors which are inherent in voice recognition technology. **      Final report electronically signed by Dr. Nelida Roca on 1/2/2023 11:54 AM          LABS: (none if blank)  Labs Reviewed   CBC WITH AUTO DIFFERENTIAL - Abnormal; Notable for the following components:       Result Value    WBC 11.9 (*)     RBC 4.55 (*)     Hematocrit 40.1 (*)     MPV 9.3 (*)     Segs Absolute 10.0 (*)     All other components within normal limits   BASIC METABOLIC PANEL - Abnormal; Notable for the following components:    Sodium 127 (*)     Chloride 92 (*)     CO2 19 (*)     Glucose 447 (*)     All other components within normal limits   BRAIN NATRIURETIC PEPTIDE - Abnormal; Notable for the following components:    Pro-BNP 3137.0 (*)     All other components within normal limits   PROTIME-INR - Abnormal; Notable for the following components:    INR 1.24 (*)     All other components within normal limits   PROCALCITONIN - Abnormal; Notable for the following components:    Procalcitonin 0.16 (*)     All other components within normal limits   URINE WITH REFLEXED MICRO - Abnormal; Notable for the following components:    Glucose, Ur >= 1000 (*)     Blood, Urine SMALL (*)     All other components within normal limits   COVID-19 & INFLUENZA COMBO   TSH   MAGNESIUM   TROPONIN   ANION GAP   OSMOLALITY   GLOMERULAR FILTRATION RATE, ESTIMATED   BETA-HYDROXYBUTYRATE       (Any cultures that may have been sent were not resulted at the time of this patient visit)    [facility] / ED COURSE:     Assessment and Plan: This is a 43 y.o. male with significant past medical history of aortic coarctation and stenosis status post Ross procedure and stenting, A. fib status post cardioversion and ablation 2 years ago, presenting with tachycardia without shortness of breath, chest pain, dizziness or hypotension. Physical exam significant for well-appearing obese male in no acute distress, irregularly irregular tachycardia, lungs clear to auscultation bilaterally, no edema or tenderness to legs.  Differential diagnoses include, but not limited to COVID, flu, pneumonia, MI, unlikely to be PE given patient's presentation, lack of chest pain or shortness of breath or leg pain. Do not feel that this patient is septic. EKG shows A. fib with RVR. Labs significant for elevated BNP, troponin within normal limits, glucose of 447, beta hydroxybutyrate within normal limits. Imaging chest x-ray shows no acute findings. Patient likely has recurrent A. fib with RVR, new onset diabetes. Patient likely has type 2 diabetes given his family history, comorbidities, age. Does not have DKA or HHS. Will not start insulin drip at this time. Would likely be better managed with 4 times daily insulin and get new onset diabetes education. Patient was given bolus of diltiazem, no improvement of heart rate. We will repeat bolus, start drip. Patient started Eliquis yesterday, is likely therapeutic at this point. Cardiologist is  at [address] [place] [address]. Sister is cards PA at [facility]. Will not initiate heparin at this time, however would be understandable if hospitalist decides to initiate heparin and discontinue Eliquis. Would like to admit this patient for A. fib RVR, as well as patient benefiting from new onset diabetes education. Pressure remained stable, patient likely stable for floor with telemetry at this time.        Significant Clinical Scoring:    Heart Score    Heart Score for chest pain patients  History: Slightly Suspicious  ECG: Non-Specifc repolarization disturbance/LBTB/PM  Patient Age: < 45 years  *Risk factors for Atherosclerotic disease: Obesity, Hypertension, Diabetes Mellitus  Risk Factors: > 3 Risk factors or history of atherosclerotic disease*  Troponin: < 1X normal limit  Heart Score Total: 3    HEART Score recommendations:  Score 0 - 3:   <1.7%  risk of MACE over next 6 wks = Outpatient workup  Score 4 - 6: 12-16% risk of MACE over next 6 wks = Admission for observation  Score 7- 10: 50-65% risk of MACE over next 6 wks = Early invasive strategy    MACE: Major Acute Cardiac Event (All Cause Mortality, AMI or revascularization)  Chest Pain in the Emergency Room: A Multicenter Validation of the [address]. Mindy BE, Jd AJ, Pat JORGE A, Camron TP, [employer], Camron EG, Leny SH, [facility]. Crit Pathw Cardiol. 2010 Sep; 9(3): 164-169. \"A prospective validation of the HEART score for chest pain patients at the emergency department. \" Int J Cardiol. 2013 Oct 3;168(3):2153-8. doi: 10.1016/j.ijcard. 2013.01.255. Epub 2013 Mar 7. Shared Decision-Making was performed and disposition discussed with the        Patient/Family and questions answered          Social determinants of health impacting treatment or disposition:  none         Code Status:  Full          Vitals Reviewed:    Vitals:    01/02/23 1119 01/02/23 1121 01/02/23 1207 01/02/23 1304   BP: (!) 86/55 (!) 114/54 (!) 111/57 92/60   Pulse: (!) 134 (!) 132 (!) 140 (!) 133   Resp: 16  18 16   Temp:       SpO2: 99%  100% 98%   Weight:       Height:           The patient was seen and examined. Appropriate diagnostic testing was performed and results reviewed with the patient. The results of pertinent diagnostic studies and exam findings were discussed. The patients provisional diagnosis and plan of care were discussed with the patient and present family who expressed understanding. Any medications were reviewed and indications and risks of medications were discussed with the patient /family present. ED Medications administered this visit:  (None if blank)  Medications   0.9 % sodium chloride bolus (1,000 mLs IntraVENous New Bag 1/2/23 1114)   dilTIAZem injection 10 mg (10 mg IntraVENous Given 1/2/23 1259)     Followed by   dilTIAZem 125 mg in dextrose 5 % 125 mL infusion (5 mg/hr IntraVENous New Bag 1/2/23 1304)   dilTIAZem injection 10 mg (10 mg IntraVENous Given 1/2/23 1114)         DISCHARGE PRESCRIPTIONS: (None if blank)  New Prescriptions    No medications on file       FINAL IMPRESSION      1.  Atrial fibrillation with rapid ventricular response [address]    2. Type 2 diabetes mellitus without complication, without long-term current use of insulin (HCC)          DISPOSITION/PLAN     Final diagnoses:   Atrial fibrillation with rapid ventricular response (HCC)   Type 2 diabetes mellitus without complication, without long-term current use of insulin (HCC)     Condition: condition: stable  Dispo: Admit to telemetry          This transcription was electronically signed. Parts of this transcriptions may have been dictated by use of voice recognition software and electronically transcribed, and parts may have been transcribed with the assistance of an ED scribe. The transcription may contain errors not detected in proofreading. Please refer to my supervising physician's documentation if my documentation differs.     Electronically Signed: Rose Franco MD, 01/02/23, 1:06 PM        Rose Franco MD  Resident  01/02/23 8469

## 2023-01-02 NOTE — ED TRIAGE NOTES
Pt to ED due to an irregular HR. Pt states that he felt fatigued yesterday and noticed his RH. Pt denies any dizziness. Pt states that yesterday he felt chest pain and cold sweats. Pt denies chest pains and sob at this time. VSS. EKG done.

## 2023-01-03 LAB
ALBUMIN SERPL-MCNC: 3.5 G/DL (ref 3.5–5.1)
ALP BLD-CCNC: 47 U/L (ref 38–126)
ALT SERPL-CCNC: 56 U/L (ref 11–66)
ANION GAP SERPL CALCULATED.3IONS-SCNC: 14 MEQ/L (ref 8–16)
AST SERPL-CCNC: 41 U/L (ref 5–40)
BASOPHILS # BLD: 0.5 %
BASOPHILS ABSOLUTE: 0 THOU/MM3 (ref 0–0.1)
BILIRUB SERPL-MCNC: 0.4 MG/DL (ref 0.3–1.2)
BILIRUBIN DIRECT: < 0.2 MG/DL (ref 0–0.3)
BUN BLDV-MCNC: 22 MG/DL (ref 7–22)
CALCIUM SERPL-MCNC: 8.4 MG/DL (ref 8.5–10.5)
CHLORIDE BLD-SCNC: 100 MEQ/L (ref 98–111)
CO2: 21 MEQ/L (ref 23–33)
CREAT SERPL-MCNC: 1.2 MG/DL (ref 0.4–1.2)
EKG ATRIAL RATE: 79 BPM
EKG P AXIS: 38 DEGREES
EKG P-R INTERVAL: 242 MS
EKG Q-T INTERVAL: 374 MS
EKG Q-T INTERVAL: 418 MS
EKG QRS DURATION: 144 MS
EKG QRS DURATION: 154 MS
EKG QTC CALCULATION (BAZETT): 479 MS
EKG QTC CALCULATION (BAZETT): 482 MS
EKG R AXIS: 79 DEGREES
EKG R AXIS: 87 DEGREES
EKG T AXIS: -14 DEGREES
EKG T AXIS: 11 DEGREES
EKG VENTRICULAR RATE: 100 BPM
EKG VENTRICULAR RATE: 79 BPM
EOSINOPHIL # BLD: 2.7 %
EOSINOPHILS ABSOLUTE: 0.3 THOU/MM3 (ref 0–0.4)
ERYTHROCYTE [DISTWIDTH] IN BLOOD BY AUTOMATED COUNT: 13.3 % (ref 11.5–14.5)
ERYTHROCYTE [DISTWIDTH] IN BLOOD BY AUTOMATED COUNT: 44.7 FL (ref 35–45)
GFR SERPL CREATININE-BSD FRML MDRD: > 60 ML/MIN/1.73M2
GLUCOSE BLD-MCNC: 174 MG/DL (ref 70–108)
GLUCOSE BLD-MCNC: 187 MG/DL (ref 70–108)
GLUCOSE BLD-MCNC: 188 MG/DL (ref 70–108)
GLUCOSE BLD-MCNC: 274 MG/DL (ref 70–108)
GLUCOSE BLD-MCNC: 280 MG/DL (ref 70–108)
HCT VFR BLD CALC: 36.6 % (ref 42–52)
HEMOGLOBIN: 12.4 GM/DL (ref 14–18)
IMMATURE GRANS (ABS): 0.07 THOU/MM3 (ref 0–0.07)
IMMATURE GRANULOCYTES: 0.7 %
LYMPHOCYTES # BLD: 19.7 %
LYMPHOCYTES ABSOLUTE: 1.9 THOU/MM3 (ref 1–4.8)
MAGNESIUM: 1.9 MG/DL (ref 1.6–2.4)
MCH RBC QN AUTO: 30.8 PG (ref 26–33)
MCHC RBC AUTO-ENTMCNC: 33.9 GM/DL (ref 32.2–35.5)
MCV RBC AUTO: 90.8 FL (ref 80–94)
MONOCYTES # BLD: 6.7 %
MONOCYTES ABSOLUTE: 0.6 THOU/MM3 (ref 0.4–1.3)
NUCLEATED RED BLOOD CELLS: 0 /100 WBC
PLATELET # BLD: 218 THOU/MM3 (ref 130–400)
PMV BLD AUTO: 9.5 FL (ref 9.4–12.4)
POTASSIUM REFLEX MAGNESIUM: 4.9 MEQ/L (ref 3.5–5.2)
RBC # BLD: 4.03 MILL/MM3 (ref 4.7–6.1)
SEG NEUTROPHILS: 69.7 %
SEGMENTED NEUTROPHILS ABSOLUTE COUNT: 6.6 THOU/MM3 (ref 1.8–7.7)
SODIUM BLD-SCNC: 135 MEQ/L (ref 135–145)
TOTAL PROTEIN: 6.3 G/DL (ref 6.1–8)
TROPONIN T: < 0.01 NG/ML
WBC # BLD: 9.5 THOU/MM3 (ref 4.8–10.8)

## 2023-01-03 PROCEDURE — 84484 ASSAY OF TROPONIN QUANT: CPT

## 2023-01-03 PROCEDURE — 80076 HEPATIC FUNCTION PANEL: CPT

## 2023-01-03 PROCEDURE — 93005 ELECTROCARDIOGRAM TRACING: CPT | Performed by: STUDENT IN AN ORGANIZED HEALTH CARE EDUCATION/TRAINING PROGRAM

## 2023-01-03 PROCEDURE — 80048 BASIC METABOLIC PNL TOTAL CA: CPT

## 2023-01-03 PROCEDURE — 2140000000 HC CCU INTERMEDIATE R&B

## 2023-01-03 PROCEDURE — 83735 ASSAY OF MAGNESIUM: CPT

## 2023-01-03 PROCEDURE — 6360000002 HC RX W HCPCS: Performed by: STUDENT IN AN ORGANIZED HEALTH CARE EDUCATION/TRAINING PROGRAM

## 2023-01-03 PROCEDURE — 82948 REAGENT STRIP/BLOOD GLUCOSE: CPT

## 2023-01-03 PROCEDURE — 93005 ELECTROCARDIOGRAM TRACING: CPT | Performed by: INTERNAL MEDICINE

## 2023-01-03 PROCEDURE — 6370000000 HC RX 637 (ALT 250 FOR IP): Performed by: STUDENT IN AN ORGANIZED HEALTH CARE EDUCATION/TRAINING PROGRAM

## 2023-01-03 PROCEDURE — 2580000003 HC RX 258: Performed by: STUDENT IN AN ORGANIZED HEALTH CARE EDUCATION/TRAINING PROGRAM

## 2023-01-03 PROCEDURE — 6370000000 HC RX 637 (ALT 250 FOR IP): Performed by: PHYSICIAN ASSISTANT

## 2023-01-03 PROCEDURE — 85025 COMPLETE CBC W/AUTO DIFF WBC: CPT

## 2023-01-03 PROCEDURE — 6370000000 HC RX 637 (ALT 250 FOR IP)

## 2023-01-03 PROCEDURE — 2500000003 HC RX 250 WO HCPCS: Performed by: INTERNAL MEDICINE

## 2023-01-03 PROCEDURE — 36415 COLL VENOUS BLD VENIPUNCTURE: CPT

## 2023-01-03 PROCEDURE — 2500000003 HC RX 250 WO HCPCS: Performed by: STUDENT IN AN ORGANIZED HEALTH CARE EDUCATION/TRAINING PROGRAM

## 2023-01-03 RX ORDER — METOPROLOL SUCCINATE 100 MG/1
100 TABLET, EXTENDED RELEASE ORAL DAILY
Status: DISCONTINUED | OUTPATIENT
Start: 2023-01-04 | End: 2023-01-04

## 2023-01-03 RX ORDER — DIGOXIN 0.25 MG/ML
250 INJECTION INTRAMUSCULAR; INTRAVENOUS ONCE
Status: COMPLETED | OUTPATIENT
Start: 2023-01-03 | End: 2023-01-03

## 2023-01-03 RX ORDER — METOPROLOL TARTRATE 50 MG/1
50 TABLET, FILM COATED ORAL 2 TIMES DAILY
Status: DISCONTINUED | OUTPATIENT
Start: 2023-01-03 | End: 2023-01-03

## 2023-01-03 RX ADMIN — METOPROLOL SUCCINATE 100 MG: 100 TABLET, EXTENDED RELEASE ORAL at 07:43

## 2023-01-03 RX ADMIN — ASPIRIN 81 MG: 81 TABLET, COATED ORAL at 07:43

## 2023-01-03 RX ADMIN — DILTIAZEM HYDROCHLORIDE 12.5 MG/HR: 5 INJECTION, SOLUTION INTRAVENOUS at 10:14

## 2023-01-03 RX ADMIN — INSULIN LISPRO 4 UNITS: 100 INJECTION, SOLUTION INTRAVENOUS; SUBCUTANEOUS at 12:26

## 2023-01-03 RX ADMIN — AMIODARONE HYDROCHLORIDE 150 MG: 1.5 INJECTION, SOLUTION INTRAVENOUS at 15:56

## 2023-01-03 RX ADMIN — AMIODARONE HYDROCHLORIDE 1 MG/MIN: 1.8 INJECTION, SOLUTION INTRAVENOUS at 16:27

## 2023-01-03 RX ADMIN — DIGOXIN 250 MCG: 0.25 INJECTION INTRAMUSCULAR; INTRAVENOUS at 13:05

## 2023-01-03 RX ADMIN — APIXABAN 5 MG: 5 TABLET, FILM COATED ORAL at 21:21

## 2023-01-03 RX ADMIN — AMIODARONE HYDROCHLORIDE 0.5 MG/MIN: 1.8 INJECTION, SOLUTION INTRAVENOUS at 22:20

## 2023-01-03 RX ADMIN — APIXABAN 5 MG: 5 TABLET, FILM COATED ORAL at 07:43

## 2023-01-03 RX ADMIN — INSULIN GLARGINE 10 UNITS: 100 INJECTION, SOLUTION SUBCUTANEOUS at 20:24

## 2023-01-03 NOTE — PLAN OF CARE
Problem: Cardiovascular - Adult  Goal: Maintains optimal cardiac output and hemodynamic stability  1/3/2023 1333 by Sha Bullock RN  Outcome: Not Progressing  Flowsheets  Taken 1/3/2023 1333  Maintains optimal cardiac output and hemodynamic stability: Monitor blood pressure and heart rate  Taken 1/3/2023 0730  Maintains optimal cardiac output and hemodynamic stability: Monitor blood pressure and heart rate  Note: Patient still in afib RVR     Problem: Discharge Planning  Goal: Discharge to home or other facility with appropriate resources  1/3/2023 1333 by Sha Bullock RN  Outcome: Progressing  Flowsheets  Taken 1/3/2023 1333  Discharge to home or other facility with appropriate resources:   Identify barriers to discharge with patient and caregiver   Arrange for needed discharge resources and transportation as appropriate  Taken 1/3/2023 0730  Discharge to home or other facility with appropriate resources: Identify barriers to discharge with patient and caregiver  Note: Rhythm still afib RVR- cardizem IV- cardiology consulted. Patient from home with wife. Problem: Safety - Adult  Goal: Free from fall injury  1/3/2023 1333 by Sha Bullock RN  Outcome: Progressing  Flowsheets (Taken 1/3/2023 0800)  Free From Fall Injury: Instruct family/caregiver on patient safety  Note: Bed locked & in low position, call light in reach, side-rails up x2, bed/chair alarm utilized, non-slip socks on when ambulating, reminded patient to use call light to call for assistance.       Problem: Metabolic/Fluid and Electrolytes - Adult  Goal: Electrolytes maintained within normal limits  Outcome: Progressing  Flowsheets (Taken 1/3/2023 1333)  Electrolytes maintained within normal limits:   Monitor labs and assess patient for signs and symptoms of electrolyte imbalances   Administer electrolyte replacement as ordered     Problem: Chronic Conditions and Co-morbidities  Goal: Patient's chronic conditions and co-morbidity symptoms are monitored and maintained or improved  Outcome: Progressing  Flowsheets (Taken 1/3/2023 1556)  Care Plan - Patient's Chronic Conditions and Co-Morbidity Symptoms are Monitored and Maintained or Improved:   Monitor and assess patient's chronic conditions and comorbid symptoms for stability, deterioration, or improvement   Collaborate with multidisciplinary team to address chronic and comorbid conditions and prevent exacerbation or deterioration  Note: Patient educated on insulin as this is a new diagnosis of diabetes    Care plan reviewed with patient. Patient verbalizes understanding of the care plan and contributed to goal setting.

## 2023-01-03 NOTE — CARE COORDINATION
Case Management Assessment  Initial Evaluation    Date/Time of Evaluation: 1/3/2023 2:54 PM  Assessment Completed by: Ba Lee RN    If patient is discharged prior to next notation, then this note serves as note for discharge by case management. Patient Name: Marleen Gautam                   YOB: 1980  Diagnosis: Atrial fibrillation with rapid ventricular response ([address] [I48.91]  Atrial fibrillation with RVR ([address] [I48.91]  Type 2 diabetes mellitus without complication, without long-term current use of insulin ([address] [E11.9]                   Date / Time: 1/2/2023 10:49 AM  [facility]     Patient Admission Status: Inpatient   Readmission Risk (Low < 19, Mod (19-27), High > 27): Readmission Risk Score: 8.8    Current PCP: ELENA Barrientos CNP  PCP verified by CM? Yes    Chart Reviewed: Yes      History Provided by: Spouse, Patient  Patient Orientation: Alert and Oriented    Patient Cognition: Alert    Hospitalization in the last 30 days (Readmission):  No    If yes, Readmission Assessment in CM Navigator will be completed. Advance Directives:      Code Status: Full Code   Patient's Primary Decision Maker is: Legal Next of Kin (States has at home, wife will look for and bring in copy)      Discharge Planning:    Patient lives with: Spouse/Significant Other, Children Type of Home: House  Primary Care Giver: Self  Patient Support Systems include: Spouse/Significant Other, Children, Parent, Family Members   Current Financial resources: Other (Comment) (Commercial insurance)  Current community resources: None (Ordering diabetes clinic)  Current services prior to admission: None            Current DME:              Type of Home Care services:  None    ADLS  Prior functional level: Independent in ADLs/IADLs  Current functional level: Independent in ADLs/IADLs    Family can provide assistance at DC:  Yes  Would you like Case Management to discuss the discharge plan with any other family members/significant others, and if so, who? No  Plans to Return to Present Housing: Yes  Other Identified Issues/Barriers to RETURNING to current housing: None  Potential Assistance needed at discharge: Other (Comment) (Diabetes clinic)            Potential DME:    Patient expects to discharge to: House  Plan for transportation at discharge: Family    Financial    Payor: Pee[employer]e [employer] / Plan: Ric Monk / Product Type: *No Product type* /     Does insurance require precert for SNF: Yes    Potential assistance Purchasing Medications: No  Meds-to-Beds request:        [address], [address] [facility] [address]  [address] [phone] Jorge Height [phone]  [address] [facility] [address]  Phone: [phone] Fax: [phone]      Notes:    Factors facilitating achievement of predicted outcomes: Family support, Motivated, Cooperative, Pleasant, Sense of humor, and Good insight into deficits    Barriers to discharge: Medical complications and Medication managment    Additional Case Management Notes: Admitted through ED with lethargy, tachycardia. Found to have afib with RVR. Cardizem gtt. Baby ASA, diabetes management, Eliquis bid, Toprol XL daily. Consulting Cardiology. Afib with RVR, ventricular rate of . Room air. Sats 98%. Procedure:   1/2 CXR: Stable cardiomegaly. Nothing acute. 1/3 Echo: ordered. The Plan for Transition of Care is related to the following treatment goals of Atrial fibrillation with rapid ventricular response (HCC) [I48.91]  Atrial fibrillation with RVR ([address] [I48.91]  Type 2 diabetes mellitus without complication, without long-term current use of insulin ([address] [E11.9]    Patient Goals/Plan/Treatment Preferences: Pt is from home with wife and kids. Plans to return there. 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 He and his wife are interested in the Diabetes Clinic and speaking to a dietitian while here. Orders received.    Transportation/Food Security/Housekeeping Addressed: No issues identified.      Becky Anderson RN  Case Management Department

## 2023-01-03 NOTE — CONSULTS
Atrial fib RVR  Hx of pVI 5 yrs back  Previous cardiac surgery  HTN  DMII  Obesity  Chadsvasc 2    Plan  Ate and rhythm control  Start amiod  Cont toprol xl  Cont cardizem drip and wean down for rate  KRISTOPHER- CV at am    [id number]    Samson Bynum MD

## 2023-01-03 NOTE — PROGRESS NOTES
Hospitalist Progress Note    Patient:  Luz Villarreal      Unit/Bed:3B-26/026-A    YOB: 1980    MRN: [id number]       Acct: [de-identified]     PCP: Powell Krabbe, APRN - CNP    Date of Admission: 1/2/2023    Assessment/Plan:    Paroxysmal Atrial fibrillation with RVR:   Patient is s/p ablation 2 years ago. Anticoagulated on Eliquis. EKG showed A-fib with RVR. Started on diltiazem gtt in ED, continued. On metoprolol and diltiazem 30mg BID at home, which was started recently on 1/1/23. Will resume his home metoprolol and add po diltiazem 30mg Q6h, wean gtt off as able. Cardiology/Dr. Amita Deshpande consulted, appreciate cards input. Keep K > 4, Mg >2     History of bicuspid AV, SAVR - last echo in 2020 mod AR, PV Stenosis, and mildly dilated aortic root. Ordered repeat echo       Hyponatremia, mild, resolved: 127 on admission. When corrected for BG of 447 Na is 133. Patient already received 1 L bolus. Will start gentle hydration. Hypertension: Patient currently is currently on the softer side at this time. Will resume his rate controlling medications as above. Hold lisinopril/hctz, resume when appropriate. NIDDMII: Newly diagnosed  HGB A1C: 9.6. ,   Will start Lantus 10U nightly plus medium dose SSI. Monitor insulin requirements and adjust as indicated. Hypoglycemia protocol, carb control diet, consult placed for dietician for diabetes education. Non-anion gap metabolic acidosis: Currently bicarb is only 19. Received IVF bolus, continuing gentle hydration. Repeat BMP in AM.     H/o AS, s/p Ross procedure 1997. 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 Follows with [facility] in outpatient setting.         Expected discharge date:  tbd    Disposition:    [] Home       [] TCU       [] Rehab       [] Psych       [] SNF       [] Mcven       [] Other-    Chief Complaint: lethargy, rapid heart rate     Hospital Course:   Per initial HPI:  \"Mc DELATORRE Gabriel Childress is a 43 y.o. male with PMHx of Paroxysmal A-Fib, hypertension, and heart murmur who presents to [facility] with lethargy/fast heart rate. Patient stated that his heart rate was noted to be elevated yesterday, which this has occurred in the past with spontaneous resolution. He notified his sister, whom works with cardiology group with [facility], that helped him get a prescription for eliquis and diltiazem in case the A-fib continued. He also mentioned several heart surgeries growing up with his last one occurring at the age of 16. He denies chest pain, shortness of breath, fever, chills, rigors and having any sick contacts. He did endorse having sweating episodes yesterday, but not so bad today. ED course: While in the ED he received a 1 L bolus of NS, EKG performed showed A-fib with RVR. Cardizem drip was initiated and blood pressure has been maintaining. \"    Subjective (past 24 hours):   Patient denies any chest pain this morning, no SOB, referred pain, fever. Continues to have rapid heart beats but overall feels fine. ROS (12 point review of systems completed. Pertinent positives noted. Otherwise ROS is negative).     Medications:  Reviewed    Infusion Medications    dilTIAZem 12.5 mg/hr (01/03/23 0737)    sodium chloride      dextrose       Scheduled Medications    apixaban  5 mg Oral BID    aspirin  81 mg Oral Daily    [Held by provider] dilTIAZem  30 mg Oral 4 times per day    sodium chloride flush  5-40 mL IntraVENous 2 times per day    [Held by provider] lisinopril  40 mg Oral Daily    metoprolol succinate  100 mg Oral BID    [Held by provider] spironolactone  25 mg Oral Daily    insulin glargine  10 Units SubCUTAneous Nightly    insulin lispro  0-8 Units SubCUTAneous TID WC    insulin lispro  0-4 Units SubCUTAneous Nightly     PRN Meds: sodium chloride flush, sodium chloride, ondansetron **OR** ondansetron, polyethylene glycol, acetaminophen **OR** acetaminophen, potassium chloride **OR** potassium alternative oral replacement **OR** potassium chloride, magnesium sulfate, glucose, dextrose bolus **OR** dextrose bolus, glucagon (rDNA), dextrose      Intake/Output Summary (Last 24 hours) at 1/3/2023 0745  Last data filed at 1/2/2023 1315  Gross per 24 hour   Intake 1000.07 ml   Output --   Net 1000.07 ml       Diet:  ADULT DIET; Regular; 5 carb choices (75 gm/meal); Low Sodium (2 gm)    Exam:  BP (!) 122/55   Pulse (!) 131   Temp 97.4 °F (36.3 °C) (Oral)   Resp 18   Ht 6' 1\" (1.854 m)   Wt (!) 340 lb (154.2 kg)   SpO2 98%   BMI 44.86 kg/m²     General appearance: No apparent distress, appears stated age and cooperative. HEENT: Pupils equal, round, and reactive to light. Conjunctivae/corneas clear. Neck: Supple, with full range of motion. No jugular venous distention. Trachea midline. Respiratory:  Normal respiratory effort. Clear to auscultation, bilaterally without Rales/Wheezes/Rhonchi. Cardiovascular: Irregularly irregular rate and rhythm with systolic murmur,  without rubs or gallops. Abdomen: Soft, non-tender, non-distended with normal bowel sounds. Musculoskeletal: passive and active ROM x 4 extremities. Skin: Skin color, texture, turgor normal.  No rashes or lesions. Neurologic:  Neurovascularly intact without any focal sensory/motor deficits. Cranial nerves: II-XII intact, grossly non-focal.  Psychiatric: Alert and oriented, thought content appropriate, normal insight  Capillary Refill: Brisk,< 3 seconds   Peripheral Pulses: +2 palpable, equal bilaterally       Labs:   Recent Labs     01/02/23  1100 01/03/23  0458   WBC 11.9* 9.5   HGB 14.0 12.4*   HCT 40.1* 36.6*    218     Recent Labs     01/02/23  1100 01/03/23  0458   * 135   K 4.7 4.9   CL 92* 100   CO2 19* 21*   BUN 22 22   CREATININE 1.2 1.2   CALCIUM 9.0 8.4*     No results for input(s): AST, ALT, BILIDIR, BILITOT, ALKPHOS in the last 72 hours.   Recent Labs     01/02/23  1100   INR 1.24* No results for input(s): Orlando Mcphersoned in the last 72 hours. Microbiology:      Urinalysis:      Lab Results   Component Value Date/Time    NITRU NEGATIVE 01/02/2023 12:30 PM    WBCUA NONE SEEN 01/02/2023 12:30 PM    BACTERIA NONE SEEN 01/02/2023 12:30 PM    RBCUA 0-2 01/02/2023 12:30 PM    BLOODU SMALL 01/02/2023 12:30 PM    GLUCOSEU >= 1000 01/02/2023 12:30 PM       Radiology:  XR CHEST (2 VW)   Final Result   1. Stable cardiomegaly. 2. No acute findings. **This report has been created using voice recognition software. It may contain minor errors which are inherent in voice recognition technology. **      Final report electronically signed by Dr. Sandy Cano on 1/2/2023 11:54 AM          DVT prophylaxis: [] Lovenox                                 [] SCDs                                 [] SQ Heparin                                 [] Encourage ambulation           [x] Already on Anticoagulation     Code Status: Full Code    PT/OT Eval Status:     Tele:   [x] yes             [] no    Electronically signed by Wesly Woo MD on 1/3/2023 at 7:45 AM

## 2023-01-03 NOTE — FLOWSHEET NOTE
01/02/23 2338   Treatment Team Notification   Reason for Communication Abnormal vitals   Team Member Name Carey Aragon   Treatment Team Role Advanced Practice Nurse   Method of Communication Secure Message   Response See orders   Notification Time 9024     Patient's blood pressure went down to 99/54. Titrated down his cardizem to 12.5. He has an order for cardizem PO at midnight. Do you still want me to give the PO? His heart rate were running from 90's-110's. Thanks    Response:no if hes still requiring 12.5 of the cardzem drip i will hold the po cardizem.  i typically start the po cadizem and then turn the drip off like thirty minutes later but if hes still requiring that much we need to continue to titrate down the drip before doing the oral cardizem

## 2023-01-03 NOTE — PLAN OF CARE
Problem: Discharge Planning  Goal: Discharge to home or other facility with appropriate resources  1/3/2023 0026 by Yashira Bridges RN  Outcome: Progressing  1/3/2023 0026 by Yashira Bridges RN  Outcome: Progressing  Flowsheets (Taken 1/3/2023 0026)  Discharge to home or other facility with appropriate resources:   Identify barriers to discharge with patient and caregiver   Identify discharge learning needs (meds, wound care, etc)     Problem: ABCDS Injury Assessment  Goal: Absence of physical injury  1/3/2023 0026 by Yashira Bridges RN  Outcome: Progressing  1/3/2023 0026 by Yashira Bridges RN  Outcome: Progressing     Problem: Safety - Adult  Goal: Free from fall injury  1/3/2023 0026 by Yashira Bridges RN  Outcome: Progressing  1/3/2023 0026 by Yashira Bridges RN  Outcome: Progressing     Problem: Cardiovascular - Adult  Goal: Maintains optimal cardiac output and hemodynamic stability  Outcome: Progressing  Flowsheets (Taken 1/3/2023 0026)  Maintains optimal cardiac output and hemodynamic stability:   Monitor blood pressure and heart rate   Monitor urine output and notify Licensed Independent Practitioner for values outside of normal range   Assess for signs of decreased cardiac output  Note: On cardizem drip. Goal: Absence of cardiac dysrhythmias or at baseline  Outcome: Progressing  Flowsheets (Taken 1/3/2023 0026)  Absence of cardiac dysrhythmias or at baseline:   Monitor cardiac rate and rhythm   Assess for signs of decreased cardiac output   Care plan reviewed with patient. Patient verbalizes understanding of the care plan and contributed to goal setting.

## 2023-01-04 VITALS
SYSTOLIC BLOOD PRESSURE: 113 MMHG | RESPIRATION RATE: 18 BRPM | WEIGHT: 315 LBS | OXYGEN SATURATION: 99 % | HEIGHT: 73 IN | HEART RATE: 83 BPM | BODY MASS INDEX: 41.75 KG/M2 | DIASTOLIC BLOOD PRESSURE: 48 MMHG | TEMPERATURE: 98.8 F

## 2023-01-04 LAB
ANION GAP SERPL CALCULATED.3IONS-SCNC: 15 MEQ/L (ref 8–16)
BUN BLDV-MCNC: 17 MG/DL (ref 7–22)
CALCIUM SERPL-MCNC: 8.6 MG/DL (ref 8.5–10.5)
CHLORIDE BLD-SCNC: 99 MEQ/L (ref 98–111)
CO2: 24 MEQ/L (ref 23–33)
CREAT SERPL-MCNC: 1.1 MG/DL (ref 0.4–1.2)
EKG ATRIAL RATE: 77 BPM
EKG P AXIS: 14 DEGREES
EKG P-R INTERVAL: 242 MS
EKG Q-T INTERVAL: 430 MS
EKG QRS DURATION: 152 MS
EKG QTC CALCULATION (BAZETT): 486 MS
EKG R AXIS: 88 DEGREES
EKG T AXIS: 47 DEGREES
EKG VENTRICULAR RATE: 77 BPM
ERYTHROCYTE [DISTWIDTH] IN BLOOD BY AUTOMATED COUNT: 13 % (ref 11.5–14.5)
ERYTHROCYTE [DISTWIDTH] IN BLOOD BY AUTOMATED COUNT: 42.5 FL (ref 35–45)
GFR SERPL CREATININE-BSD FRML MDRD: > 60 ML/MIN/1.73M2
GLUCOSE BLD-MCNC: 162 MG/DL (ref 70–108)
GLUCOSE BLD-MCNC: 179 MG/DL (ref 70–108)
GLUCOSE BLD-MCNC: 217 MG/DL (ref 70–108)
HCT VFR BLD CALC: 40.4 % (ref 42–52)
HEMOGLOBIN: 13.5 GM/DL (ref 14–18)
MAGNESIUM: 1.9 MG/DL (ref 1.6–2.4)
MCH RBC QN AUTO: 30.3 PG (ref 26–33)
MCHC RBC AUTO-ENTMCNC: 33.4 GM/DL (ref 32.2–35.5)
MCV RBC AUTO: 90.6 FL (ref 80–94)
PLATELET # BLD: 244 THOU/MM3 (ref 130–400)
PMV BLD AUTO: 9.4 FL (ref 9.4–12.4)
POTASSIUM REFLEX MAGNESIUM: 4.4 MEQ/L (ref 3.5–5.2)
RBC # BLD: 4.46 MILL/MM3 (ref 4.7–6.1)
SODIUM BLD-SCNC: 138 MEQ/L (ref 135–145)
WBC # BLD: 8.5 THOU/MM3 (ref 4.8–10.8)

## 2023-01-04 PROCEDURE — 82948 REAGENT STRIP/BLOOD GLUCOSE: CPT

## 2023-01-04 PROCEDURE — 6370000000 HC RX 637 (ALT 250 FOR IP): Performed by: PHYSICIAN ASSISTANT

## 2023-01-04 PROCEDURE — 2580000003 HC RX 258

## 2023-01-04 PROCEDURE — 6370000000 HC RX 637 (ALT 250 FOR IP): Performed by: NURSE PRACTITIONER

## 2023-01-04 PROCEDURE — 80048 BASIC METABOLIC PNL TOTAL CA: CPT

## 2023-01-04 PROCEDURE — 6370000000 HC RX 637 (ALT 250 FOR IP): Performed by: STUDENT IN AN ORGANIZED HEALTH CARE EDUCATION/TRAINING PROGRAM

## 2023-01-04 PROCEDURE — 6370000000 HC RX 637 (ALT 250 FOR IP)

## 2023-01-04 PROCEDURE — 93005 ELECTROCARDIOGRAM TRACING: CPT | Performed by: NURSE PRACTITIONER

## 2023-01-04 PROCEDURE — 36415 COLL VENOUS BLD VENIPUNCTURE: CPT

## 2023-01-04 PROCEDURE — 2500000003 HC RX 250 WO HCPCS: Performed by: INTERNAL MEDICINE

## 2023-01-04 PROCEDURE — 85027 COMPLETE CBC AUTOMATED: CPT

## 2023-01-04 PROCEDURE — 83735 ASSAY OF MAGNESIUM: CPT

## 2023-01-04 RX ORDER — GLUCOSAMINE HCL/CHONDROITIN SU 500-400 MG
1 CAPSULE ORAL 3 TIMES DAILY
Qty: 90 STRIP | Refills: 0 | Status: SHIPPED | OUTPATIENT
Start: 2023-01-04 | End: 2023-02-03

## 2023-01-04 RX ORDER — AMIODARONE HYDROCHLORIDE 200 MG/1
200 TABLET ORAL DAILY
Status: DISCONTINUED | OUTPATIENT
Start: 2023-01-04 | End: 2023-01-04 | Stop reason: HOSPADM

## 2023-01-04 RX ORDER — METOPROLOL SUCCINATE 100 MG/1
100 TABLET, EXTENDED RELEASE ORAL 2 TIMES DAILY
Status: DISCONTINUED | OUTPATIENT
Start: 2023-01-04 | End: 2023-01-04 | Stop reason: HOSPADM

## 2023-01-04 RX ORDER — DILTIAZEM HYDROCHLORIDE 120 MG/1
120 CAPSULE, COATED, EXTENDED RELEASE ORAL DAILY
Qty: 30 CAPSULE | Refills: 3 | Status: SHIPPED | OUTPATIENT
Start: 2023-01-05

## 2023-01-04 RX ORDER — DILTIAZEM HYDROCHLORIDE 120 MG/1
120 CAPSULE, COATED, EXTENDED RELEASE ORAL DAILY
Status: DISCONTINUED | OUTPATIENT
Start: 2023-01-05 | End: 2023-01-04 | Stop reason: HOSPADM

## 2023-01-04 RX ORDER — DULAGLUTIDE 0.75 MG/.5ML
0.75 INJECTION, SOLUTION SUBCUTANEOUS WEEKLY
Qty: 4 ADJUSTABLE DOSE PRE-FILLED PEN SYRINGE | Refills: 0 | Status: SHIPPED | OUTPATIENT
Start: 2023-01-04

## 2023-01-04 RX ORDER — AMIODARONE HYDROCHLORIDE 200 MG/1
200 TABLET ORAL DAILY
Qty: 30 TABLET | Refills: 1 | Status: SHIPPED | OUTPATIENT
Start: 2023-01-05

## 2023-01-04 RX ADMIN — METOPROLOL SUCCINATE 100 MG: 100 TABLET, EXTENDED RELEASE ORAL at 08:36

## 2023-01-04 RX ADMIN — SODIUM CHLORIDE, PRESERVATIVE FREE 10 ML: 5 INJECTION INTRAVENOUS at 08:36

## 2023-01-04 RX ADMIN — ASPIRIN 81 MG: 81 TABLET, COATED ORAL at 08:36

## 2023-01-04 RX ADMIN — AMIODARONE HYDROCHLORIDE 200 MG: 200 TABLET ORAL at 10:45

## 2023-01-04 RX ADMIN — APIXABAN 5 MG: 5 TABLET, FILM COATED ORAL at 08:36

## 2023-01-04 RX ADMIN — AMIODARONE HYDROCHLORIDE 0.5 MG/MIN: 1.8 INJECTION, SOLUTION INTRAVENOUS at 10:14

## 2023-01-04 RX ADMIN — INSULIN LISPRO 2 UNITS: 100 INJECTION, SOLUTION INTRAVENOUS; SUBCUTANEOUS at 12:30

## 2023-01-04 NOTE — CONSULTS
[address] [place] [address]                                  CONSULTATION    PATIENT NAME: Leo Hussein                      :        1980  MED REC NO:   [id number]                           ROOM:       0026  ACCOUNT NO:   [de-identified]                           ADMIT DATE: 2023  PROVIDER:     Clarisa Arthur M.D.    Colt Reges:  2023    REASON FOR CONSULTATION:  Atrial fibrillation with rapid ventricular  response. HISTORY OF PRESENT ILLNESS:  This is a 42-year-old pleasant   gentleman with a significant past medical history of paroxysmal atrial  fibrillation, status post ablation over five years ago, has been in  normal rhythm ever since; hypertension and history of cardiac murmur;  and history of cardiovascular surgeries and interventions since  childhood, multiple ones, was in usual state of health until three days  ago, on , when he started to have a sudden onset of sweating,  feeling palpitation and feeling tired and fatigued and then he found his  heart rate was noted to be going fast and with no resolution. He  notified his sister who worked in the Cardiology Group at [facility]  and helped him with a prescription with Eliquis for presumed atrial  fibrillation. So, he started Eliquis. The patient has been on  anticoagulation before the ablation. Once the ablation was successful,  anticoagulation was stopped and so now he _____ anticoagulation and so  finally next day, he came to the emergency room, found to be in AFib  with rapid ventricular response and so admitted. Started on Cardizem  drip, now he is on 12.5 mg per hour, but still rate is not well  controlled and so Cardiology evaluation was sought for further  evaluation and management. Denied having any chest pain. No new  shortness of breath. No orthopnea or paroxysmal nocturnal dyspnea, no  leg swelling.   So, basically the only symptom he had was a shortness of  breath a little bit and basically mainly sweating that when he checked  his heart rate and noted heart rate was going fast.  In the emergency  room, 1 liter of bolus normal saline was given and Cardizem drip was  initiated. Currently, rate is in the 120s being on the Cardizem drip. Denied having any chest pain. REVIEW OF SYSTEMS:  Negative except the above-mentioned ones. PAST MEDICAL HISTORY:  Obtained from the review of the record on Care  Everywhere and the patient is following at [facility] and at [facility] and he has history of paroxysmal atrial fibrillation and in  2016 status post ablation, history of coarctation of aorta for which he  had end-to-end anastomosis repair when he was by 30 days of his infancy  and then subsequently narrowed and required balloon angioplasty for  coarctation of aorta. He had aortic valvuloplasty 04/02/2018 which  ended up in a severe significant regurgitation and the patient ended up  having Ross procedure. The patient subsequently ended up having  regurgitation, ended up having Ross procedure in 1997 and history of  coarctation repair in 1980 and so basically he has aortic balloon  valvuloplasty in 1981 and repeat balloon valvuloplasty in 1996 which  ended up having a valvular regurgitation and ended up having Ross  procedure in 1997 a year later. Heart catheterization procedure on 05/18/2021, right and retrograde left  angiography. 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     FAMILY HISTORY:  No family history of premature coronary artery disease,  but his grandfather has some coronary artery disease and grandmother has  high blood pressure. SOCIAL HISTORY:  He is a never smoker and occasional alcohol  consumption. No drug use. ALLERGIES:  NO KNOWN DRUG ALLERGIES. HOME MEDICATIONS:  Prior to this presentation include:  Started Eliquis  recently, aspirin, diltiazem 30 mg twice a day, lisinopril 40 mg daily,  metoprolol- mg twice a day, spironolactone 25 mg daily. PHYSICAL EXAMINATION:  GENERAL:  Looks sick, in no form of distress. VITAL SIGNS:  Blood pressure 111/66, pulse rate 119, respiratory rate  18, temperature 98. 2. HEENT:  Pink conjunctivae. Anicteric sclerae. Pupils are equal and  reactive bilateral to light. NECK:  No lymphadenopathy. No goiter. No JVD. CHEST:  Clear to auscultation and percussion. CARDIOVASCULAR:   Arteries are felt, carotid, femoral, pedal.  No  bruits. S1, S2 well heard. There is no gallop rhythm, but there is an  ejection systolic murmur grade 5/6 best heard in the pulmonary area as  well as in the aortic area and radiation to the neck and to  infraclavicular region. ABDOMEN:  Soft, nontender, nondistended. Bowel sounds are positive. GENITALIA:  No CVA, flank or suprapubic tenderness. LOCOMOTOR:  No cyanosis, clubbing, or muscle or joint tenderness. SKIN:  No rashes, ulcers, or nodules. CNS:  Alert, awake, oriented to time, person and place. Cranial nerves  are intact. Sensation is intact to light touch and pain. Motor:   Normal muscle strength and tone. Appropriate mood and affect. WORKUP:  EKG, basically atrial fibrillation with rapid ventricular  response of 118 beats per minute and underlying left bundle-branch block  and as known that is not new apparently.     He had an echocardiogram done in 2020 here and they said normal LV  function, reported history of bicuspid valve, aortic valve replacement  with Ross and mild to moderate aortic regurgitation as in 2020.    Chemistry:  Sodium 135, potassium 4.9, BUN 25, creatinine 1.2. Hematology:  White blood cell 9.5, hemoglobin 12.4, platelets 642. Chest x-ray, apparently no acute abnormality noted with a stable  cardiomegaly. Record reviewed on Care Everywhere and we have an echocardiogram on  05/19/2021 and it has been reported as such in the coarctation of aorta  repair status post stent placement with trivial to mild flow  acceleration and status post aortic stent and aortic valve replacement  using Ross procedure, status post mitral valve replacement with 22 mm  pulmonary homograft and history of bicuspid aortic valve, aortic root  moderate dilatation, mitral tricuspid regurgitation, normal LV size and  systolic function, mild to moderate shoaib-aortic regurgitation, mild to  moderate shoaib-pulmonary regurgitation. On the CAT scan, there is a  report of coarctation of aorta and end-to-end anastomosis repair at  infancy and status post Ross procedure in 1997 with 22 x 6 mm pulmonary  homograft. ASSESSMENT:  1. Recurrence of atrial fibrillation with rapid ventricular response. He needs a better rate control/rhythm control. 2.  History of status post pulmonary vein isolation with ablation for  atrial fibrillation several years ago per the patient, off Eliquis  before, now resumed back. 3.  Hyponatremia of 127 apparently on admission reported and now sodium  is corrected to 135.  4.  Hypertension. 5.  Diabetes, newly diagnosed, hemoglobin A1c 9.6 and blood glucose 447. Non-anion gap metabolic acidosis with bicarb of 19.  6.  History of aortic stenosis for which the patient had Ross procedure  in 1997 and pulmonary homograft in the pulmonary valve. 7.  History of coarctation of aorta, has end-to-end anastomosis as  infancy and status post angioplasty and stent placement done apparently  later.   8.  History of mild to moderate pulmonary and aortic regurgitation,  history of left bundle-branch block, history of pulmonary vein  isolation, following [facility] [place]. PLAN AND RECOMMENDATIONS:  At this level, we will start him on  amiodarone drip for better rate control and possibly rhythm control. Should that not be successful, we will schedule him for KRISTOPHER  cardioversion tomorrow. I discussed with the patient the risks and  benefits and the patient verbalized understanding and agreed with plan  of care. The patient has high CHADs-VASc of 2 at least with diabetes  and hypertension, needs oral anticoagulation. So, we will continue with  oral anticoagulation. The patient may need pulmonary vein isolation down the line and so that  repeat pulmonary vein isolation and ablation for atrial fibrillation. So, we will send him to his electrophysiology, start outpatient on  discharge and also we will leave that as a discussion of the [facility] to make a decision on that and now at this level, we will  schedule with possibility of cardioversion, wean off the Cardizem drip  as tolerated. Based on the course, we will gauge further care. Thank you for allowing me to participate in the care of this patient. I  will follow up with you. Cody Tineo.  Lucian Mendez M.D.    D: 01/03/2023 15:54:36       T: 01/03/2023 20:18:53     [email or web address]  Job#: [id number]     Doc#: [id number]

## 2023-01-04 NOTE — PLAN OF CARE
Problem: Discharge Planning  Goal: Discharge to home or other facility with appropriate resources  1/4/2023 0230 by Jeff Kowalski RN  Outcome: Progressing  Flowsheets (Taken 1/4/2023 0230)  Discharge to home or other facility with appropriate resources:   Identify barriers to discharge with patient and caregiver   Identify discharge learning needs (meds, wound care, etc)  1/3/2023 1333 by Lacey Ace RN  Outcome: Progressing  Flowsheets  Taken 1/3/2023 1333  Discharge to home or other facility with appropriate resources:   Identify barriers to discharge with patient and caregiver   Arrange for needed discharge resources and transportation as appropriate  Taken 1/3/2023 0730  Discharge to home or other facility with appropriate resources: Identify barriers to discharge with patient and caregiver  Note: Rhythm still afib RVR- cardizem IV- cardiology consulted. Patient from home with wife. Problem: ABCDS Injury Assessment  Goal: Absence of physical injury  Outcome: Progressing     Problem: Safety - Adult  Goal: Free from fall injury  1/4/2023 0230 by Jeff Kowalski RN  Outcome: Progressing  Note: Bed locked & in low position, call light in reach, side-rails up x2, bed/chair alarm utilized, non-slip socks on when ambulating, reminded patient to use call light to call for assistance. 1/3/2023 1333 by Lacey Ace RN  Outcome: Progressing  Flowsheets (Taken 1/3/2023 0800)  Free From Fall Injury: Instruct family/caregiver on patient safety  Note: Bed locked & in low position, call light in reach, side-rails up x2, bed/chair alarm utilized, non-slip socks on when ambulating, reminded patient to use call light to call for assistance.       Problem: Cardiovascular - Adult  Goal: Maintains optimal cardiac output and hemodynamic stability  1/4/2023 0230 by Jeff Kowalski RN  Outcome: Progressing  Flowsheets (Taken 1/4/2023 0230)  Maintains optimal cardiac output and hemodynamic stability:   Monitor blood pressure and heart rate   Monitor urine output and notify Licensed Independent Practitioner for values outside of normal range   Assess for signs of decreased cardiac output  1/3/2023 1333 by Dane Phan RN  Outcome: Not Progressing  Flowsheets  Taken 1/3/2023 1333  Maintains optimal cardiac output and hemodynamic stability: Monitor blood pressure and heart rate  Taken 1/3/2023 0730  Maintains optimal cardiac output and hemodynamic stability: Monitor blood pressure and heart rate  Note:  Patient's heart rhythm converted to Normal Sinus Rhythm.  Still on amiodarone drip at .5  Goal: Absence of cardiac dysrhythmias or at baseline  Outcome: Progressing  Flowsheets (Taken 1/4/2023 0230)  Absence of cardiac dysrhythmias or at baseline:   Monitor cardiac rate and rhythm   Assess for signs of decreased cardiac output     Problem: Metabolic/Fluid and Electrolytes - Adult  Goal: Electrolytes maintained within normal limits  1/4/2023 0230 by Cj White RN  Outcome: Progressing  1/3/2023 1333 by Dane Phan RN  Outcome: Progressing  Flowsheets (Taken 1/3/2023 1333)  Electrolytes maintained within normal limits:   Monitor labs and assess patient for signs and symptoms of electrolyte imbalances   Administer electrolyte replacement as ordered     Problem: Chronic Conditions and Co-morbidities  Goal: Patient's chronic conditions and co-morbidity symptoms are monitored and maintained or improved  1/4/2023 0230 by Cj White RN  Outcome: Progressing  Flowsheets (Taken 1/4/2023 0230)  Care Plan - Patient's Chronic Conditions and Co-Morbidity Symptoms are Monitored and Maintained or Improved:   Monitor and assess patient's chronic conditions and comorbid symptoms for stability, deterioration, or improvement   Collaborate with multidisciplinary team to address chronic and comorbid conditions and prevent exacerbation or deterioration  1/3/2023 1333 by Dane Phan RN  Outcome: Progressing  Flowsheets (Taken 1/3/2023 1333)  Care Plan - Patient's Chronic Conditions and Co-Morbidity Symptoms are Monitored and Maintained or Improved:   Monitor and assess patient's chronic conditions and comorbid symptoms for stability, deterioration, or improvement   Collaborate with multidisciplinary team to address chronic and comorbid conditions and prevent exacerbation or deterioration  Note: Patient educated on insulin as this is a new diagnosis of diabetes

## 2023-01-04 NOTE — PROGRESS NOTES
Cardiology Progress Note      Patient:  Avni Vargas  YOB: 1980  MRN: [id number]   Acct: [de-identified]  Admit Date:  1/2/2023  Primary Cardiologist: [facility] Cardiologist: Osmany Garcia MD    Rationale for Cardiology consult: Atrial fibrillation with rapid ventricular response. HPI/PMH: Per Dr. Claudette Bunch consult note: This is a 68-year-old pleasant   gentleman with a significant past medical history of paroxysmal atrial  fibrillation, status post ablation over five years ago, has been in  normal rhythm ever since; hypertension and history of cardiac murmur;  and history of cardiovascular surgeries and interventions since  childhood, multiple ones, was in usual state of health until three days  ago, on Sunday, when he started to have a sudden onset of sweating,  feeling palpitation and feeling tired and fatigued and then he found his  heart rate was noted to be going fast and with no resolution. He  notified his sister who worked in the Cardiology Group at [facility]  and helped him with a prescription with Eliquis for presumed atrial  fibrillation. So, he started Eliquis. The patient has been on  anticoagulation before the ablation. Once the ablation was successful,  anticoagulation was stopped and so now he _____ anticoagulation and so  finally next day, he came to the emergency room, found to be in AFib  with rapid ventricular response and so admitted. Started on Cardizem  drip, now he is on 12.5 mg per hour, but still rate is not well  controlled and so Cardiology evaluation was sought for further  evaluation and management. Denied having any chest pain. No new  shortness of breath. No orthopnea or paroxysmal nocturnal dyspnea, no  leg swelling.   So, basically the only symptom he had was a shortness of  breath a little bit and basically mainly sweating that when he checked  his heart rate and noted heart rate was going fast.  In the emergency  room, 1 liter of bolus normal saline was given and Cardizem drip was  initiated. Currently, rate is in the 120s being on the Cardizem drip. Denied having any chest pain. Chief Complaint: \"Feel good now - when can I go home? \"    Subjective (Events in last 24 hours):    Stable OVN; remains in SR   BP stable; off IV Cardizem  Labs stable  No chest pain or pressure  No dyspnea  No lightheadedness or dizziness; no syncope or near syncope    Objective:   BP (!) 113/53   Pulse 81   Temp 97.7 °F (36.5 °C) (Oral)   Resp 16   Ht 6' 1\" (1.854 m)   Wt (!) 334 lb (151.5 kg)   SpO2 97%   BMI 44.07 kg/m²       TELEMETRY:  70 - 80's    Physical Exam:  General Appearance: alert and oriented to person, place and time, in no acute distress, up in chair at bedside  Cardiovascular: normal rate, regular rhythm, normal S1 and S2,  (+) murmurs, rubs, clicks, or gallops, distal pulses intact, no JVD  Pulmonary/Chest: clear to auscultation bilaterally- no wheezes, rales or rhonchi, normal air movement, no respiratory distress, RA  Abdomen: soft, non-tender, non-distended, normal bowel sounds   Extremities: no cyanosis, clubbing or edema, pulses intact   Skin: warm and dry, no rash or erythema  Head: normocephalic and atraumatic  Eyes: pupils equal, round, and reactive to light  Neck: supple and non-tender without mass, no thyromegaly   Musculoskeletal: normal range of motion, no joint swelling, deformity or tenderness  Neurological: alert, oriented, normal speech, no focal findings or movement disorder noted    Medications:    metoprolol succinate  100 mg Oral Daily    apixaban  5 mg Oral BID    aspirin  81 mg Oral Daily    [Held by provider] dilTIAZem  30 mg Oral 4 times per day    sodium chloride flush  5-40 mL IntraVENous 2 times per day    [Held by provider] lisinopril  40 mg Oral Daily    [Held by provider] spironolactone  25 mg Oral Daily    insulin glargine  10 Units SubCUTAneous Nightly    insulin lispro  0-8 Units SubCUTAneous TID WC    insulin lispro  0-4 Units SubCUTAneous Nightly      amiodarone 0.5 mg/min (23)    dilTIAZem Stopped (23)    sodium chloride      dextrose       sodium chloride flush, 5-40 mL, PRN  sodium chloride, , PRN  ondansetron, 4 mg, Q8H PRN   Or  ondansetron, 4 mg, Q6H PRN  polyethylene glycol, 17 g, Daily PRN  acetaminophen, 650 mg, Q6H PRN   Or  acetaminophen, 650 mg, Q6H PRN  potassium chloride, 40 mEq, PRN   Or  potassium alternative oral replacement, 40 mEq, PRN   Or  potassium chloride, 10 mEq, PRN  magnesium sulfate, 2,000 mg, PRN  glucose, 4 tablet, PRN  dextrose bolus, 125 mL, PRN   Or  dextrose bolus, 250 mL, PRN  glucagon (rDNA), 1 mg, PRN  dextrose, , Continuous PRN        Diagnostics:  EK2023  EKG 12 Lead  Order: [id number]  Status: Preliminary result    Visible to patient: No (not released)    Next appt: 2023 at 04:00 PM in Internal Medicine Praveena Burnette, RD, LD)    0 Result Notes  Component Ref Range & Units 23 0732 1/3/23 1632 1/3/23 0800 23 1052 21 1520 16 1016 16 0829   Ventricular Rate BPM 77 P  79  100  144  77  84  62    Atrial Rate BPM 77 P  79    77  336  62    P-R Interval ms 242 P  242    210   240    QRS Duration ms 152 P  154  144  142  160  98  94    Q-T Interval ms 430 P  418  374  330  418  388  462    QTc Calculation (Bazett) ms 486 P  479  482  510  473  458  468    P Axis degrees 14 P  38    21   16    R Axis degrees 88 P  87  79  93  85  71  67    T Axis degrees 47 P  -14  11  64  27  112  64    Resulting Agency  [facility] MUSE [facility] MUSE [facility] MUSE [facility] MUSE [facility] MUSE [facility] MUSE [facility] MUSE           Narrative & Impression    Sinus rhythm with 1st degree A-V block  Intraventricular conduction delay  Abnormal ECG  When compared with ECG of 2023 16:32,  Premature atrial complexes are no longer Present  Nonspecific T wave abnormality no longer evident in Inferior leads             Echo: 2021; [facility]  SUMMARY:    1. S/p end to end coarctation repair and recent stent placement with trivial to   mild flow acceleration. 2. S/p s/p aortic distal arch stent. 3. S/p aortic valve replacement using the Ross procedure. 4. S/p pulmonary valve replacement with a 26 mm pulmonary homograft. The   pulmonary valve leaflets are not seen well due to artifact. Mild to moderate   pulmonary valve stenosis. 5. History of bicuspid aortic valve. 6. Aortic root moderately dilated. 7. Mild tricuspid valve regurgitation. 8. Normal left ventricular size and systolic function. 9. Mild to moderate shoaib-aortic valve regurgitation. 10. Mild to moderate shoaib-pulmonary valve regurgitation. 11. No pericardial effusion. Narrative  Performed by Lucio Guillermo  Pediatric Echocardiogram Report   Pt. Name:    Lluvia Marin Study Date:  5/19/2021   Med Rec #:   [id number]       Study Time:  7:07:35 AM   [id number]       Accession #: [id number]   YOB: 1980     Pt. Height:  187.0 cm   Patient Age: 39 years      Pt. Weight:  151.6 kg   Pt. Gender:  M             Pt. BSA:     2.87 mÂ²   Exam Site:   [facility] MRN: [id number]   Referring Physician: Brock Ray   Primary Sonographer: Ryan Dooley   Interpreting Physician:  Ketan Bernal   Additional Reviewing MD:   Date      Past Medical History/Event             BAV s/p Ross procedure             S/p pulmonary valve replacement with a 26 mm pulmonary homograft. 5/18/2021 s/p aortic stent   Study Information: Study image quality was suboptimal. The study experienced   technical challenges due to poor echo windows and patient obesity. CT chest 3/8/2021: [facility]  CT Chest Angiography with Contrast    Anatomical Region Laterality Modality   Chest -- Computed Tomography   Vascular -- --     Impression    1.   Coarctation of the aorta status post end-to-end anastomosis repair in   infancy, aortic stenosis status post Ross procedure 1997 with 26 mm pulmonary   homograft. 2.  Elongated transverse arch with squared off appearance. Mild narrowing at   the site of the end to end anastomosis, with minimum caliber of 16 x 14 mm,   stable when compared to prior imaging from 2016. 3.  Moderate to severely dilated aortic root with mildly dilated ascending   aorta, stable when compared to prior imaging in 2016.   4.  Mild diffuse narrowing in the midportion of the RV PA conduit measuring 14    x 17 mm, stable when compared to prior imaging from 2016. Progressive   calcification of the conduit when compared to prior study from 2005. 5.  Evidence of prior granulomatous disease in the lungs. .   6.  Patchy ground glass opacities in the lung bases, with interspersed areas   of hyperlucency, which may indicate small airways disease or sequelae of   pulmonary vascular disease. NOTE: This study and results reported in conjunction with Balwinder Zamora and   Stuart Snow (Cardiology) and Dr. Sharonda Pop (Radiology). Martha Moore MD, have supervised the procedure and/or image   review, and agree with the above interpretation and report. Narrative    REASON FOR EXAM:  new BP gradients - evaluate coarctation site ;Coarctation of    aorta   COMPARISON: Chest CT obtained December 2, 2005. Cardiac MRI obtained May 26,   2016. Lab Data:    Cardiac Enzymes:  No results for input(s): CKTOTAL, CKMB, CKMBINDEX, TROPONINI in the last 72 hours.     CBC:   Lab Results   Component Value Date/Time    WBC 8.5 01/04/2023 04:20 AM    RBC 4.46 01/04/2023 04:20 AM    HGB 13.5 01/04/2023 04:20 AM    HCT 40.4 01/04/2023 04:20 AM     01/04/2023 04:20 AM       CMP:    Lab Results   Component Value Date/Time     01/04/2023 04:20 AM    K 4.4 01/04/2023 04:20 AM    CL 99 01/04/2023 04:20 AM    CO2 24 01/04/2023 04:20 AM    BUN 17 01/04/2023 04:20 AM    CREATININE 1.1 01/04/2023 04:20 AM    LABGLOM >60 01/04/2023 04:20 AM    GLUCOSE 162 01/04/2023 04:20 AM    CALCIUM 8.6 01/04/2023 04:20 AM       Hepatic Function Panel:    Lab Results   Component Value Date/Time    ALKPHOS 47 01/03/2023 04:58 AM    ALT 56 01/03/2023 04:58 AM    AST 41 01/03/2023 04:58 AM    PROT 6.3 01/03/2023 04:58 AM    BILITOT 0.4 01/03/2023 04:58 AM    BILIDIR <0.2 01/03/2023 04:58 AM    LABALBU 3.5 01/03/2023 04:58 AM       Magnesium:    Lab Results   Component Value Date/Time    MG 1.9 01/04/2023 04:20 AM       PT/INR:    Lab Results   Component Value Date/Time    INR 1.24 01/02/2023 11:00 AM       HgBA1c:    Lab Results   Component Value Date/Time    LABA1C 9.1 01/02/2023 11:00 AM       FLP:    Lab Results   Component Value Date/Time    TRIG 111 05/30/2016 04:32 AM    HDL 33 05/30/2016 04:32 AM    LDLCALC 91 05/30/2016 04:32 AM       TSH:    Lab Results   Component Value Date/Time    TSH 1.650 01/02/2023 11:00 AM         Assessment/Plan (as discussed with Dr. Hemanth Lockett):  Recurrent atrial fib with RVR  Now in SR 70 - 80's  Hx pulm vein isolation with ablation  Presented with Afib RVR; onset 3 days PTA; had resumed Eliquis with recurrence PTA; had started Cardizem 30 mg Cardizem BID PTA  Cardizem  mg QD starting 1/5/23  Rate controlled with Cardizem gtt (now off for lower BP when in afib)   Amiodarone gtt  Initiate Amiodarone 200 mg po daily  Toprol  mg QD; resume as BID as PTA  Eliquis 5 mg BID  pEF  Echo 5/2021 [facility] LV structure and function  HTN  Lisinopril - on hold for lower BP  Continue to hold at discharge; follow-up with his primary cardiologist and resume as appropriate  Aldactone; may resume 1/5/23  DB - newly dx  Hx AS   status post Ross procedure 1997 with 26 mm pulmonary homograft. Hx coarctation of aorta  S/p end to end coarctation repair and recent stent placement    Start po Amiodarone; stop infusion. Ambulate in baptiste; monitor heart rate and rhythm. If remains clinically stable, in SR, then may be discharged to home today from cardiology standpoint.    Follow-up with his EP physician in [place] in next 2 - 3 weeks. Follow-up with Dr. Hawley Brochure in next 1-2 weeks. May return to work on Monday 1/9/23 as discussed with patient.        Electronically signed by ELENA Parra CNP on 1/4/2023 at 9:54 AM

## 2023-01-04 NOTE — PROGRESS NOTES
Nutrition Education    Received c/s - diet education - diabetic diet; newly diagnosed. HgbA1c 9.1%. Plan diabetes clinic at discharge. RN checking on obtaining script for meter so pt. Can monitor blood sugars at discharge. Educated on CHO controlled diet; blood sugar goals, label reading. Learners: Patient and Significant Other  Readiness: Eager  Method: Explanation, Handout, and Teachback  Response: Verbalizes Understanding  Contact name and number provided.     Marina Valerio RD, LD  Contact Number: [phone]

## 2023-01-04 NOTE — CARE COORDINATION
1/4/23, 1:27 PM EST    Patient goals/plan/ treatment preferences discussed by  and . Patient goals/plan/ treatment preferences reviewed with patient/ family. Patient/ family verbalize understanding of discharge plan and are in agreement with goal/plan/treatment preferences. Understanding was demonstrated using the teach back method. AVS provided by RN at time of discharge, which includes all necessary medical information pertaining to the patients current course of illness, treatment, post-discharge goals of care, and treatment preferences. Services At/After Discharge: Outpatient Diabetes Clinic             Planning home today with wife and kids. New Diabetes clinic appt made.

## 2023-01-04 NOTE — DISCHARGE INSTRUCTIONS
Follow up with Dr. Salina Ayala in next 1 - 2 weeks. Follow-up with EP in [facility] in next 3 - 4 weeks or sooner as determined per Dr. Salina Ayala. Continue Toprol  mg twice daily. Take the Amiodarone 200 mg daily. Start the Cardizem  mg daily on Thursday 1/5/2023. This will help control your heart rate; especially if you go back into atrail fib. Do not take your Lisinopril until discussed with Dr. Salina Ayala - blood pressure will be lower with the cardizem and toprol together. May resume your Aldactone on 1/5/2023.

## 2023-01-04 NOTE — PROGRESS NOTES
This patient follows with [facility] Dr. Carlito Holman. Due to his recent echo 9/22/22 with [facility], Dr. Claudette Bunch discontinued his echo order.

## 2023-01-04 NOTE — DISCHARGE SUMMARY
Hospital Medicine Discharge Summary      Patient Identification:   Caty Dodd   : 1980  MRN: [id number]   Account: [de-identified]      Patient's PCP: ELENA Sinha CNP    Admit Date: 2023     Discharge Date:   23    Admitting Physician: No admitting provider for patient encounter. Discharge Physician: Vipul Arcos MD     Discharge Diagnoses:    Paroxysmal Atrial fibrillation with RVR:   Patient is s/p ablation 2 years ago. Anticoagulated on Eliquis. EKG showed A-fib with RVR. Rate and rhythm control on diltiazem and amio gtt, bb. Drips transitioned to PO and discharged. Keep K > 4, Mg >2     NIDDMII: Newly diagnosed  HGB A1C: 9.6. ,   Was on Lantus 10U nightly plus medium dose SSI during the hospital stay. On discharge, initiated weekly Trulicity 4.26 and metformin 500 mg BID to promote weight loss and control DM. Discussed with patient and educated proper use of these medications. Studies show comparable and even superior efficacy, for insulin naive patients with his a1c range as compared to insulin and metformin. Regardless, patient would have significant cardiovascular benefits as a result of weight loss in the setting of GLP-1 agonist. Consider adding SGLT-2 depending on EF. Anyway cards Dc'ed TTE so further work up at [facility] where he is established. Must follow up with PCP regarding DM. May go up to trulicity 1.5 mg next week, but after that titrate up trulicity dose to maximally tolerated q4wks. CBC, BMP, C-peptide next week prior to PCP visit. Advised checking blood glucose 4 times daily. Testing strips provided. Glucose tabs prn. History of bicuspid AV, SAVR - last echo in  mod AR, PV Stenosis, and mildly dilated aortic root. Ordered repeat echo     Hyponatremia, mild, resolved: 127 on admission. When corrected for BG of 447 Na is 133. Patient already received 1 L bolus. Will start gentle hydration.      Hypertension: Patient currently is currently on the softer side at this time. Will resume his rate controlling medications as above. Hold lisinopril/hctz, resume when appropriate. Non-anion gap metabolic acidosis: Currently bicarb is only 19. Received IVF bolus, continuing gentle hydration. Repeat BMP in AM.     H/o AS, s/p Ross procedure 1997. 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 Follows with Jairon in outpatient setting. The patient was seen and examined on day of discharge and this discharge summary is in conjunction with any daily progress note from day of discharge. Hospital Course:   Howard Flowers is a 43 y.o. male admitted to [facility] on 1/2/2023 for rapid heart beat. \"Mc Arredondo is a 43 y.o. male with PMHx of Paroxysmal A-Fib, hypertension, and heart murmur who presents to [facility] with lethargy/fast heart rate. Patient stated that his heart rate was noted to be elevated yesterday, which this has occurred in the past with spontaneous resolution. He notified his sister, whom works with cardiology group with [facility], that helped him get a prescription for eliquis and diltiazem in case the A-fib continued. He also mentioned several heart surgeries growing up with his last one occurring at the age of 16. He denies chest pain, shortness of breath, fever, chills, rigors and having any sick contacts. He did endorse having sweating episodes yesterday, but not so bad today. ED course: While in the ED he received a 1 L bolus of NS, EKG performed showed A-fib with RVR. Cardizem drip was initiated and blood pressure has been maintaining. \"     Patient was initiated on Cardizem drip however failed to control his rate and therefore amiodarone gtt was added. These medications were subsequently transitioned to p.o., rate and rhythm controlled, safely discharged. Notably patient was found to have new onset diabetes.   Treatment was initiated as above see assessment and plan. Discharged on Eliquis, amiodarone 200 mg qd, diltiazem  mg daily and Toprol- mg twice daily. Diabetes: Weekly Trulicity 4.33, metformin 500 bid. Exam:     Vitals:  Vitals:    01/03/23 2330 01/04/23 0414 01/04/23 0830 01/04/23 1130   BP: (!) 119/58 (!) 119/58 (!) 113/53 (!) 113/48   Pulse: 80 79 81 83   Resp: 19 16 16 18   Temp: 97.9 °F (36.6 °C) 97.7 °F (36.5 °C) 97.7 °F (36.5 °C) 98.8 °F (37.1 °C)   TempSrc: Oral Oral Oral Oral   SpO2: 97% 98% 97% 99%   Weight:  (!) 334 lb (151.5 kg)     Height:         Weight: Weight: (!) 334 lb (151.5 kg)     24 hour intake/output:  Intake/Output Summary (Last 24 hours) at 1/4/2023 1437  Last data filed at 1/4/2023 1341  Gross per 24 hour   Intake 1794.89 ml   Output --   Net 1794.89 ml         General appearance:  No apparent distress, appears stated age and cooperative. HEENT:  Normal cephalic, atraumatic without obvious deformity. Pupils equal, round, and reactive to light. Extra ocular muscles intact. Conjunctivae/corneas clear. Neck: Supple, with full range of motion. No jugular venous distention. Trachea midline. Respiratory:  Normal respiratory effort. Clear to auscultation, bilaterally without Rales/Wheezes/Rhonchi. Cardiovascular:  Irregularly irregular rate and rhythm with normal S1/S2 without murmurs, rubs or gallops. Abdomen: Soft, non-tender, non-distended with normal bowel sounds. Musculoskeletal:  No clubbing, cyanosis or edema bilaterally. Full range of motion without deformity. Skin: Skin color, texture, turgor normal.  No rashes or lesions. Neurologic:  Neurovascularly intact without any focal sensory/motor deficits. Cranial nerves: II-XII intact, grossly non-focal.  Psychiatric:  Alert and oriented, thought content appropriate, normal insight  Capillary Refill: Brisk,< 3 seconds   Peripheral Pulses: +2 palpable, equal bilaterally       Labs:  For convenience and continuity at follow-up the following most recent labs are provided:      CBC:    Lab Results   Component Value Date/Time    WBC 8.5 01/04/2023 04:20 AM    HGB 13.5 01/04/2023 04:20 AM    HCT 40.4 01/04/2023 04:20 AM     01/04/2023 04:20 AM       Renal:    Lab Results   Component Value Date/Time     01/04/2023 04:20 AM    K 4.4 01/04/2023 04:20 AM    CL 99 01/04/2023 04:20 AM    CO2 24 01/04/2023 04:20 AM    BUN 17 01/04/2023 04:20 AM    CREATININE 1.1 01/04/2023 04:20 AM    CALCIUM 8.6 01/04/2023 04:20 AM         Significant Diagnostic Studies    Radiology:   XR CHEST (2 VW)   Final Result   1. Stable cardiomegaly. 2. No acute findings. **This report has been created using voice recognition software. It may contain minor errors which are inherent in voice recognition technology. **      Final report electronically signed by Dr. Yvonne Mendez on 1/2/2023 11:54 AM             Consults:     IP CONSULT TO CARDIOLOGY  IP CONSULT TO DIETITIAN    Disposition: Home  Condition at Discharge: Stable    Code Status:  Full Code     Patient Instructions:    Discharge lab work: cbc, bmp, c-peptide  Activity: activity as tolerated  Diet: ADULT DIET; Regular; 5 carb choices (75 gm/meal); Low Sodium (2 gm)      Follow-up visits:   ELENA De La Torre - CNP  [address] [address]  [phone]    Go on 1/13/2023  Uncontrolled diabetes, initiated trulicity and metformin. Follow up at 8:30         Discharge Medications:        Medication List        START taking these medications      amiodarone 200 MG tablet  Commonly known as: CORDARONE  Take 1 tablet by mouth daily  Start taking on: January 5, 2023     blood glucose test strips  1 strip by Other route 3 times daily Test 3 times a day & as needed for symptoms of irregular blood glucose. Dispense sufficient amount for indicated testing frequency plus additional to accommodate PRN testing needs.      dilTIAZem 120 MG extended release capsule  Commonly known as: CARDIZEM CD  Take 1 capsule by mouth daily  Start taking on: January 5, 2023     glucose 4 g chewable tablet  Take 4 tablets by mouth as needed for Low blood sugar     metFORMIN 500 MG tablet  Commonly known as: GLUCOPHAGE  Take 1 tablet by mouth 2 times daily (with meals)     Trulicity 6.81 MASON/9.6EN Sopn  Generic drug: Dulaglutide  Inject 0.75 mg into the skin once a week            CONTINUE taking these medications      Ankle Splint/Night AirForm Misc  1 Device by Does not apply route every evening Plantar fasciitis of right foot  (primary encounter diagnosis)      Dispense posterior night splint. apixaban 5 MG Tabs tablet  Commonly known as: Eliquis  Take 1 tablet by mouth 2 times daily     aspirin 81 MG tablet     metoprolol succinate 100 MG extended release tablet  Commonly known as: TOPROL XL     spironolactone 25 MG tablet  Commonly known as: ALDACTONE            STOP taking these medications      dilTIAZem 30 MG tablet  Commonly known as: CARDIZEM     lisinopril 40 MG tablet  Commonly known as: PRINIVIL;ZESTRIL               Where to Get Your Medications        These medications were sent to [facility], [address] [facility] [phone]  [address]      Phone: [phone]   amiodarone 200 MG tablet  apixaban 5 MG Tabs tablet  blood glucose test strips  dilTIAZem 120 MG extended release capsule  glucose 4 g chewable tablet  metFORMIN 553 MG tablet  Trulicity 1.75 RG/0.0OD Sopn         Time Spent on discharge is more than 45 minutes in the examination, evaluation, counseling and review of medications and discharge plan. Signed: Thank you ELENA Benitez CNP for the opportunity to be involved in this patient's care.     Electronically signed by Dalia Martino MD on 1/4/2023 at 2:37 PM

## 2023-01-04 NOTE — PROGRESS NOTES
Discussed discharge summary with patient. Instructed patient about medications & follow up appointments. Patient denies any additional questions.     Instructed patient to  medications at [facility] in [place].Patient was discharged with all belongings. No distress noted. Patient discharged to home.    Patient is a new diabetic. Instructed patient about insulin administration with both an insulin needle and a pen. Patient demonstrated both back and this was done correctly.     Instructed patient to call Dr. Sage to make a follow up appointment in 1-2 weeks and to also make an appointment with his EP physician in 2-3 weeks.

## 2023-01-09 ENCOUNTER — HOSPITAL ENCOUNTER (OUTPATIENT)
Age: 43
Discharge: HOME | End: 2023-01-09
Payer: COMMERCIAL

## 2023-01-09 LAB
ANION GAP SERPL CALCULATED.3IONS-SCNC: 16 MEQ/L (ref 8–16)
BUN BLDV-MCNC: 14 MG/DL (ref 7–22)
CALCIUM SERPL-MCNC: 8.9 MG/DL (ref 8.5–10.5)
CHLORIDE BLD-SCNC: 104 MEQ/L (ref 98–111)
CO2: 20 MEQ/L (ref 23–33)
CREAT SERPL-MCNC: 1.2 MG/DL (ref 0.4–1.2)
ERYTHROCYTE [DISTWIDTH] IN BLOOD BY AUTOMATED COUNT: 13.1 % (ref 11.5–14.5)
ERYTHROCYTE [DISTWIDTH] IN BLOOD BY AUTOMATED COUNT: 44.9 FL (ref 35–45)
GFR SERPL CREATININE-BSD FRML MDRD: > 60 ML/MIN/1.73M2
GLUCOSE BLD-MCNC: 115 MG/DL (ref 70–108)
HCT VFR BLD CALC: 39.8 % (ref 42–52)
HEMOGLOBIN: 12.9 GM/DL (ref 14–18)
MCH RBC QN AUTO: 30.4 PG (ref 26–33)
MCHC RBC AUTO-ENTMCNC: 32.4 GM/DL (ref 32.2–35.5)
MCV RBC AUTO: 93.9 FL (ref 80–94)
PLATELET # BLD: 265 THOU/MM3 (ref 130–400)
PMV BLD AUTO: 9.6 FL (ref 9.4–12.4)
POTASSIUM SERPL-SCNC: 4.5 MEQ/L (ref 3.5–5.2)
RBC # BLD: 4.24 MILL/MM3 (ref 4.7–6.1)
SODIUM BLD-SCNC: 140 MEQ/L (ref 135–145)
WBC # BLD: 9.2 THOU/MM3 (ref 4.8–10.8)

## 2023-01-09 PROCEDURE — 80048 BASIC METABOLIC PNL TOTAL CA: CPT

## 2023-01-09 PROCEDURE — 84681 ASSAY OF C-PEPTIDE: CPT

## 2023-01-09 PROCEDURE — 36415 COLL VENOUS BLD VENIPUNCTURE: CPT

## 2023-01-09 PROCEDURE — 85027 COMPLETE CBC AUTOMATED: CPT

## 2023-01-10 LAB — C-PEPTIDE: 4.3 NG/ML (ref 1.1–4.4)

## 2023-01-11 ENCOUNTER — OFFICE VISIT (OUTPATIENT)
Dept: INTERNAL MEDICINE CLINIC | Age: 43
End: 2023-01-11
Payer: COMMERCIAL

## 2023-01-11 VITALS — WEIGHT: 315 LBS | HEIGHT: 73 IN | BODY MASS INDEX: 41.75 KG/M2

## 2023-01-11 DIAGNOSIS — E11.9 NEWLY DIAGNOSED DIABETES (HCC): Primary | ICD-10-CM

## 2023-01-11 PROCEDURE — 97802 MEDICAL NUTRITION INDIV IN: CPT | Performed by: DIETITIAN, REGISTERED

## 2023-01-11 NOTE — PATIENT INSTRUCTIONS
1. )  Get familiar with reading the nutrition facts label by looking at serving size and total carbohydrates. - Without a label refer to your carb count guide booklet. 2.)  Measure foods for accuracy in carb counting.    3.)  Healthy carb choices:  whole grains, whole wheat pasta, starchy vegetables, fresh fruit and lowfat/non-fat milk and yogurt. 4.)  Your meal plan is found on the inside cover of your carb counting guide booklet:  1st meal or Brkf:  60 gms carbohydrates + 1-2 oz protein  2nd meal or Lunch: 60-75 gms carbohydrates + 2-3 oz protein + non-starchy veggies  3rd meal or Dinner:  60-75 gms carbohydrates + 2-3 oz protein + non- starchy veggies    Snack time:  15 - 20 gms carbohydrates + 1 oz protein x1-2 snacks/day    5.)  Choose lean protein most of the time and Cut in 1/2 added fats to help with weight loss efforts. 6.) Bring a 1-2 week food log and meter to next dietitian appt. Thanks. Check BS:  2-3x/day  Fasting BS (1st thing in the morning) or before a meal (at least 4 hour since last ate), BS goal:  90 - 130  2 hours after the start of a meal, BS goal:  100 - 150    7.)  Think of an exercise plan to put into place.

## 2023-01-11 NOTE — PROGRESS NOTES
[facility]  [facility]  [address] [facility], [facility]. [address] [place] [address]  [phone] (phone)  [phone] (fax)    Patient Name: Mc Pelaez. Date of Birth: [id number]. MRN: [id number]      Assessment: Patient is a 42 y.o. male seen for Initial MNT visit for Type 2 DB.     -Nutritionally relevant labs:   Lab Results   Component Value Date/Time    LABA1C 9.1 (H) 01/02/2023 11:00 AM    GLUCOSE 115 (H) 01/09/2023 10:39 AM    GLUCOSE 162 (H) 01/04/2023 04:20 AM    CHOL 146 05/30/2016 04:32 AM    HDL 33 05/30/2016 04:32 AM    LDLCALC 91 05/30/2016 04:32 AM    TRIG 111 05/30/2016 04:32 AM     Newly diagnosed DB. In the hospital last week - admitting  and pt with hx of Atrial Fib.  DB meds:  Trulicity 0.75 mg weekly  Metformin 500 mg 2x/day    -Blood sugar trends: Relion meter. Checking 2x/day.  FBS:  138 - 168, highest 203 (after late night supper @ 9 pm)    Pt here with wife Liban.  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 Mon - Friday.  Household includes wife Liban and 3 kids - ages 12, 14, &  16. And kids in extracurricular activities so often times busy in the late afternoon & evenings.    -Food recall:   Breakfast: 5- 530 am - before work - brk pizza from [employer], use to drink Energy drink but not anymore, drinking 1 - 10 oz cup of caramel flavored coffee from a [employer] and adds 1 TB flavored liquid creamer.   Bread - beef jerky or cashews - from vending machine  Lunch: packs lunch - 2 ham and cheese sandwiches OR 2 burritos OR 2 hot pockets OR ramen noodles. Diet pepsi to drink.   Afternoon snack - if busy in the evening - likes baby bell cheese or cereal - honey nut cheerios with 2% milk.  Dinner: Last night - 730 - 8 pm - Hamburger marline with ketchup cheese and mustard no bun or bread.   Snacks: Occ microwave popcorn.    Eat out - 2x/week - Ex. 3 sl pizza - meat lovers.    -Main Beverages: water. Diet pop 20 oz with lunch.  Occ  milk.    -Impression of Dietary Intake: on average, 3 meals per day, on average, 2 fast food meals per week, high fat/ cholesterol, lacking routine intake of fruits and vegetables, 1-2 snacks/day    Current Outpatient Medications on File Prior to Visit   Medication Sig Dispense Refill    amiodarone (CORDARONE) 200 MG tablet Take 1 tablet by mouth daily 30 tablet 1    apixaban (ELIQUIS) 5 MG TABS tablet Take 1 tablet by mouth 2 times daily 60 tablet 2    dilTIAZem (CARDIZEM CD) 120 MG extended release capsule Take 1 capsule by mouth daily 30 capsule 3    blood glucose monitor strips 1 strip by Other route 3 times daily Test 3 times a day & as needed for symptoms of irregular blood glucose. Dispense sufficient amount for indicated testing frequency plus additional to accommodate PRN testing needs. 90 strip 0    metFORMIN (GLUCOPHAGE) 500 MG tablet Take 1 tablet by mouth 2 times daily (with meals) 180 tablet 1    Dulaglutide (TRULICITY) 3.99 YX/2.5RB SOPN Inject 0.75 mg into the skin once a week 4 Adjustable Dose Pre-filled Pen Syringe 0    glucose 4 g chewable tablet Take 4 tablets by mouth as needed for Low blood sugar 60 tablet 3    spironolactone (ALDACTONE) 25 MG tablet Take 25 mg by mouth daily      Elastic Bandages & Supports (ANKLE SPLINT/NIGHT AIRFORM) MISC 1 Device by Does not apply route every evening Plantar fasciitis of right foot  (primary encounter diagnosis)      Dispense posterior night splint. 1 each 0    metoprolol (TOPROL-XL) 100 MG XL tablet Take 100 mg by mouth 2 times daily      aspirin 81 MG tablet Take 81 mg by mouth daily       No current facility-administered medications on file prior to visit. Vitals from current and previous visits:  Ht 6' 1\" (1.854 m)   Wt (!) 332 lb (150.6 kg)   BMI 43.80 kg/m²     -Body mass index is 43.8 kg/m². Greater than 40 - Morbid Obesity / Extreme Obesity / Grade III. -Weight goal: lose weight.      Nutrition Diagnosis:   Food and nutrition-related knowledge deficit related to Lack of previous MNT/currently undergoing MNT as evidenced by Newly diagnosed with Type 2 DB         Intervention:  -Instructed the patient on: Carbohydrate Counting, Consistent Carbohydrate Intake, Food Label Reading, Meal Planning for Regular, Balanced Meals & Snacks, and The Importance of Regular Physical Activity    -Handouts given for: carbohydrate counting, food logging, healthy snacks, 200 gm carb sample meal plans/menus, and website list, why take care of your diabetes, whats in a meter, my plate pictures, AIC goal.    Patient Instructions   1.)  Get familiar with reading the nutrition facts label by looking at serving size and total carbohydrates. - Without a label refer to your carb count guide booklet. 2.)  Measure foods for accuracy in carb counting.    3.)  Healthy carb choices:  whole grains, whole wheat pasta, starchy vegetables, fresh fruit and lowfat/non-fat milk and yogurt. 4.)  Your meal plan is found on the inside cover of your carb counting guide booklet:  1st meal or Brkf:  60 gms carbohydrates + 1-2 oz protein  2nd meal or Lunch: 60-75 gms carbohydrates + 2-3 oz protein + non-starchy veggies  3rd meal or Dinner:  60-75 gms carbohydrates + 2-3 oz protein + non- starchy veggies    Snack time:  15 - 20 gms carbohydrates + 1 oz protein x1-2 snacks/day    5.)  Choose lean protein most of the time and Cut in 1/2 added fats to help with weight loss efforts. 6.) Bring a 1-2 week food log and meter to next dietitian appt. Thanks. Check BS:  2-3x/day  Fasting BS (1st thing in the morning) or before a meal (at least 4 hour since last ate), BS goal:  90 - 130  2 hours after the start of a meal, BS goal:  100 - 150    7.)  Think of an exercise plan to put into place.    -Nutrition prescription: 2000 calories/day, 180 - 225 g carbs/day. Adj wt. 221# (101 kg)    Comprehension verified using teachback method.     Monitoring/Evaluation:   -Followup visit: 8 weeks with dietitian, General group class, 6 weeks with RN-CDE.   -Receptiveness to education/goals: Agreeable.  -Evaluation of education: Indicates understanding.  -Readiness to change: contemplation - ambivalent about change measuring food for accuracy in carb counting.  -Expected compliance: good. Thank you for your referral of this patient. Total time involved in direct patient education: 45 minutes for initial MNT visit.

## 2023-01-30 ENCOUNTER — HOSPITAL ENCOUNTER (OUTPATIENT)
Dept: NON INVASIVE DIAGNOSTICS | Age: 43
Discharge: HOME | End: 2023-01-30
Payer: COMMERCIAL

## 2023-01-30 DIAGNOSIS — I48.91 ATRIAL FIBRILLATION, UNSPECIFIED TYPE (HCC): ICD-10-CM

## 2023-01-30 PROCEDURE — 93303 ECHO TRANSTHORACIC: CPT

## 2023-01-30 PROCEDURE — 93320 DOPPLER ECHO COMPLETE: CPT

## 2023-01-30 PROCEDURE — 93325 DOPPLER ECHO COLOR FLOW MAPG: CPT

## 2023-02-15 ENCOUNTER — NURSE ONLY (OUTPATIENT)
Dept: INTERNAL MEDICINE CLINIC | Age: 43
End: 2023-02-15

## 2023-02-15 DIAGNOSIS — E11.69 TYPE 2 DIABETES MELLITUS WITH OTHER SPECIFIED COMPLICATION, WITHOUT LONG-TERM CURRENT USE OF INSULIN (HCC): Primary | ICD-10-CM

## 2023-02-15 NOTE — PROGRESS NOTES
Patient presented for the [facility]  education class. The content was presented via PowerPoint, lecture and case-based format covering the following concepts: 60mins education. What is Diabetes?   Define glycosolation  Interpretation of the A1C and goal.  Pancreatic function  Target organs and macro and microvascular complications  Goals for SGM BP goals  Meal clearance   S&S hyper/hypoglycemia and tx   Insulin physiology-basal/bolus  Navigating sick days stress  Relationship between diet, exercise, meds and stress   Utilizing mikie care system at appropriate time  Assuming responsibility in self management  Troubleshooting patterns

## 2023-03-06 ENCOUNTER — TELEPHONE (OUTPATIENT)
Dept: INTERNAL MEDICINE CLINIC | Age: 43
End: 2023-03-06

## 2023-03-06 NOTE — TELEPHONE ENCOUNTER
Received call from patient - cough and swelling has worsened for the past week. Taking an antibiotic for the cough. Sue Quevedo attributes his symptoms to his diabetes medications and wants a sooner follow-up appt with the diabetes clinic. Discussed that his diabetes medications are not associated with swelling. Encouraged scheduling of an urgent follow-up appt with his PCP to address this issue prior to his diabetes clinic appt next week.      For Pharmacy Admin Tracking Only    Program: Medical Group  Time Spent (min): 430 Rosalind Rangel, PharmD, [facility]  Internal Medicine Clinical Pharmacist  [phone]

## 2023-03-09 ENCOUNTER — OFFICE VISIT (OUTPATIENT)
Dept: PULMONOLOGY | Age: 43
End: 2023-03-09
Payer: COMMERCIAL

## 2023-03-09 VITALS
HEIGHT: 73 IN | WEIGHT: 315 LBS | SYSTOLIC BLOOD PRESSURE: 120 MMHG | HEART RATE: 78 BPM | DIASTOLIC BLOOD PRESSURE: 60 MMHG | TEMPERATURE: 98.4 F | BODY MASS INDEX: 41.75 KG/M2 | OXYGEN SATURATION: 96 %

## 2023-03-09 DIAGNOSIS — R06.83 SNORING: ICD-10-CM

## 2023-03-09 DIAGNOSIS — I48.91 ATRIAL FIBRILLATION WITH RAPID VENTRICULAR RESPONSE (HCC): ICD-10-CM

## 2023-03-09 DIAGNOSIS — Q23.0 CONGENITAL STENOSIS OF AORTIC VALVE: ICD-10-CM

## 2023-03-09 DIAGNOSIS — I48.19 PERSISTENT ATRIAL FIBRILLATION (HCC): ICD-10-CM

## 2023-03-09 DIAGNOSIS — G47.10 HYPERSOMNIA: Primary | ICD-10-CM

## 2023-03-09 DIAGNOSIS — E66.01 MORBID OBESITY WITH BMI OF 40.0-44.9, ADULT (HCC): ICD-10-CM

## 2023-03-09 PROBLEM — I10 HYPERTENSION: Status: ACTIVE | Noted: 2021-05-18

## 2023-03-09 PROCEDURE — 99214 OFFICE O/P EST MOD 30 MIN: CPT | Performed by: NURSE PRACTITIONER

## 2023-03-09 PROCEDURE — 3078F DIAST BP <80 MM HG: CPT | Performed by: NURSE PRACTITIONER

## 2023-03-09 PROCEDURE — 3074F SYST BP LT 130 MM HG: CPT | Performed by: NURSE PRACTITIONER

## 2023-03-09 ASSESSMENT — ENCOUNTER SYMPTOMS
EYES NEGATIVE: 1
NAUSEA: 0
COUGH: 0
WHEEZING: 0
DIARRHEA: 0
ABDOMINAL PAIN: 0
SHORTNESS OF BREATH: 0
VOMITING: 0

## 2023-03-09 NOTE — PROGRESS NOTES
[facility]    Patient: Armida Cifuentes, 43 y.o.   : 1980  3/9/2023    Pt of Olga      Subjective     Chief Complaint   Patient presents with    New Patient     New sleep consult. No prior sleep studies. Echo 23. HPI  Danyell Barillas was referred by BRIANNA Plummer  for snoring. New onset of Afib follows with cardiology at [facility]   Congential coarctation of aorta, aortic stenosis     SLEEP HISTORY    Sleep Symptoms  This has not been evaluated or treated before. Sleep related complaints include snoring, snorting, falling asleep while reading, watching television as well. Danyell Barillas denies any history of seizures, sleep walking or talking and there is no history suggestive of bruxism or restless leg syndrome. Bedtime Narative  He goes to bed at 8:30-9 pm  and wakes up at 4 am . Danyell Barillas reports that this is not different on the weekends. Most of the time, it takes him less than 30 min  to fall asleep without difficulty falling back to sleep if he awakens, usually because he has to go to the bathroom. Nap History  Danyell Barillas does take naps very often. If he does, they are not helpful. Snoring History  Danyell Barillas does snore or has been told he snores. There have not been witnessed apneas. He does not awaken with choking or gasping. Evan Pino has had recurring dreams in last 2 nights, typically does not recall his dreams,  and specifically does not have episodes of feeling paralyzed while awake, or anything to suggest cataplexy or dreams he would describe as hallucinations. Driving History  Danyell Barillas does not report sleepiness while driving. There have not been driving accidents or near-misses related to sleepiness, fatigue or inattention. Weight Issues  There has not been a change in his weight over the past year. And 30 lb weight gain over last 5 years. Caffeine Intake  Danyell Barillas does not drink caffeine, usually about  0  per day.   Has been taking Michele HERNADEZN to aid in sleep     Employment History  Shari Fernandez works for , will be starting as a . The work hours are generally 6 am - 2:30 pm  and there has not been any recent changes in the shifts or hours. Family Sleep History  There are not family members with a history of sleep problems. Sleep Hx     Sleep symptoms:  Yes No  Additional Comments   Snoring [x] []    Witness Apneas [] [x]        Waking snorting/gasping [] [x]     Nocturia []    [x]     Legs kicking at night [] [x]     AM Headaches [] [x]     Non-restorative sleep [x] []     Daytime sleepiness/fatigue [x] []     Daytime napping [x] []  on weekends    Drowsiness while driving [] [x]     Memory issues [] [x]     Decreased concentration [] [x]     Cataplexy [] [x]     Hypnogogic hallucinations [] [x]     Sleep paralysis [] [x]     Other [] [x]        Symptoms began : years ago, unable to approximate date      Previous evaluation and treatment has included-none       DOT/CDL - no  FAA/'s license - no     Previous Report(s) Reviewed: historical medical records, office notes and referral letter/letters      Download     N/A      Past Medical hx     PMH:  Past Medical History:   Diagnosis Date    Atrial fibrillation with RVR ([address]     Coarctation of aorta 1980    surgery at birth for this    H/O Ross procedure 1997    Heart murmur     History of blood transfusion     Hypertension     Seizures ([address]      SURGICAL HISTORY:  Past Surgical History:   Procedure Laterality Date    ADENOIDECTOMY      CARDIAC CATHETERIZATION      2 caths    CARDIAC SURGERY  1980    aortic stenosis & coarctation repair    CARDIAC VALVE REPLACEMENT  1997    ross procedure at age 16    TONSILLECTOMY       SOCIAL HISTORY:  Social History     Tobacco Use    Smoking status: Never    Smokeless tobacco: Current     Types: Chew   Substance Use Topics    Alcohol use:  Yes     Alcohol/week: 2.5 standard drinks     Types: 3 Standard drinks or equivalent per week     Comment: rarely    Drug use: No     ALLERGIES:No Known Allergies  FAMILY HISTORY:  Family History   Problem Relation Age of Onset    Arthritis Mother     High Blood Pressure Mother     Arthritis Father     High Blood Pressure Father     High Cholesterol Father     Heart Disease Other     High Blood Pressure Brother     High Blood Pressure Brother     High Blood Pressure Paternal Grandmother     High Cholesterol Paternal Grandmother     Heart Disease Paternal Grandfather     High Blood Pressure Paternal Grandfather     Atrial Fibrillation Maternal Grandmother     Cancer Maternal Grandfather      CURRENT MEDICATIONS:  Current Outpatient Medications   Medication Sig Dispense Refill    amiodarone (CORDARONE) 200 MG tablet Take 1 tablet by mouth daily 30 tablet 1    apixaban (ELIQUIS) 5 MG TABS tablet Take 1 tablet by mouth 2 times daily 60 tablet 2    dilTIAZem (CARDIZEM CD) 120 MG extended release capsule Take 1 capsule by mouth daily 30 capsule 3    metFORMIN (GLUCOPHAGE) 500 MG tablet Take 1 tablet by mouth 2 times daily (with meals) 180 tablet 1    Dulaglutide (TRULICITY) 1.05 TD/0.9ZB SOPN Inject 0.75 mg into the skin once a week 4 Adjustable Dose Pre-filled Pen Syringe 0    glucose 4 g chewable tablet Take 4 tablets by mouth as needed for Low blood sugar 60 tablet 3    spironolactone (ALDACTONE) 25 MG tablet Take 25 mg by mouth daily      Elastic Bandages & Supports (ANKLE SPLINT/NIGHT AIRFORM) MISC 1 Device by Does not apply route every evening Plantar fasciitis of right foot  (primary encounter diagnosis)      Dispense posterior night splint. 1 each 0    metoprolol (TOPROL-XL) 100 MG XL tablet Take 100 mg by mouth 2 times daily      aspirin 81 MG tablet Take 81 mg by mouth daily      blood glucose monitor strips 1 strip by Other route 3 times daily Test 3 times a day & as needed for symptoms of irregular blood glucose.  Dispense sufficient amount for indicated testing frequency plus additional to accommodate PRN testing needs. 90 strip 0     No current facility-administered medications for this visit. Mukesh BEAR   Review of Systems   Constitutional:  Positive for fatigue and unexpected weight change. Negative for activity change, appetite change, chills and fever. HENT: Negative. Eyes: Negative. Respiratory:  Negative for cough, shortness of breath and wheezing. Cardiovascular:  Positive for palpitations. Negative for chest pain and leg swelling. Gastrointestinal:  Negative for abdominal pain, diarrhea, nausea and vomiting. Genitourinary: Negative. Musculoskeletal: Negative. Skin: Negative. Neurological: Negative. Hematological: Negative. Psychiatric/Behavioral: Negative. Negative for sleep disturbance. Physical exam   /60   Pulse 78   Temp 98.4 °F (36.9 °C)   Ht 6' 1\" (1.854 m)   Wt (!) 339 lb 12.8 oz (154.1 kg)   SpO2 96%   BMI 44.83 kg/m²      Neck Size: 21.5  Mallampati Mallampati 3  ESS:  12  SAQLI: 53  Physical Exam  Vitals and nursing note reviewed. Constitutional:       Appearance: Normal appearance. He is morbidly obese. HENT:      Head: Normocephalic and atraumatic. Mouth/Throat:      Pharynx: Oropharynx is clear. Comments: Narrow mouth , large tongue   Eyes:      Conjunctiva/sclera: Conjunctivae normal.   Pulmonary:      Effort: Pulmonary effort is normal. No tachypnea, bradypnea or respiratory distress. Skin:     Findings: No erythema or rash. Neurological:      Mental Status: He is alert and oriented to person, place, and time. Psychiatric:         Attention and Perception: Attention normal.         Mood and Affect: Mood normal.         Speech: Speech normal.         Behavior: Behavior normal.         Thought Content: Thought content normal.         Cognition and Memory: Cognition normal.         Judgment: Judgment normal.        Sleep Study   N/A   Assessment      Diagnosis Orders   1.  Hypersomnia  Sleep Study with PAP Titration      2. Snoring  Sleep Study with PAP Titration      3. Morbid obesity with BMI of 40.0-44.9, adult ([facility] [address]  Sleep Study with PAP Titration      4. Atrial fibrillation with rapid ventricular response (HCC)        5. Persistent atrial fibrillation (HCC)  Sleep Study with PAP Titration      6.  Congenital stenosis of aortic valve          Plan   Given symptoms, Afib , large neck circumference , narrow mouth , large tongue: has high risk for obstructive sleep apnea;  will going proceed with a sleep study ; order placed for Split night PSG   Discussed the study at length with patient  Discussed treatment options if sleep study is positive  Encouraged to work on weight loss  Encouraged to get in 7 to 9 hours of sleep  Pulmonary hygiene discussed  Follow-up pending sleep study     Billing based on time, total time on encounter: 30  min     Electronically signed by ELENA Banuelos CNP on 3/9/2023 at 9:41 AM

## 2023-03-13 ENCOUNTER — OFFICE VISIT (OUTPATIENT)
Dept: INTERNAL MEDICINE CLINIC | Age: 43
End: 2023-03-13
Payer: COMMERCIAL

## 2023-03-13 VITALS
SYSTOLIC BLOOD PRESSURE: 132 MMHG | TEMPERATURE: 98.2 F | HEART RATE: 77 BPM | WEIGHT: 315 LBS | DIASTOLIC BLOOD PRESSURE: 60 MMHG | HEIGHT: 73 IN | BODY MASS INDEX: 41.75 KG/M2

## 2023-03-13 DIAGNOSIS — E11.69 TYPE 2 DIABETES MELLITUS WITH OTHER SPECIFIED COMPLICATION, WITHOUT LONG-TERM CURRENT USE OF INSULIN (HCC): Primary | ICD-10-CM

## 2023-03-13 PROCEDURE — G0108 DIAB MANAGE TRN  PER INDIV: HCPCS | Performed by: INTERNAL MEDICINE

## 2023-03-13 NOTE — PROGRESS NOTES
Diabetes Mellitus Type II, Initial Visit:   DAVID Taylor CNP  Patient here for an initial evaluation of Type 2 diabetes mellitus.  Current symptoms/problems include cough and have been unchanged. Symptoms have been present for 3 months.    The patient was initially diagnosed with Type 2 diabetes mellitus 1/2023.  Known diabetic complications: cardiovascular disease  Cardiovascular risk factors: diabetes mellitus, hypertension, male gender, and obesity (BMI >= 30 kg/m2)  Current diabetic medications include none.     Eye exam current (within one year): yes 1/2023  [facility]  Foot:   Weight trend: stable; not sure  Prior visit with dietician: yes - 1/11/2023  Current diet: B skips  2 times per week                                Donut or tornado/ coffee/ milk                       L salad  or prepared rice dinner                       D chicken patties; parmeson  Current exercise: 6-2:30pm .                  [employer] tart new/ increase activity job next month    Current monitoring regimen: home blood tests - 1-2 times daily. Did not bring meter to appointment.  Home blood sugar records: fasting range: \"120-140 \"                                         Before dinner: \"95\"  Blood sugar in the office today 3.5 hrpp at 95mg/dL  Any episodes of hypoglycemia? yes - rare at work with physical                       activity    Is He on ACE inhibitor or angiotensin II receptor blocker?   Yes   lisinopril (Prinivil)       Focus  Initial visit for Diabetes education. A1C from 1/2/23 9.1%. Mc did not bring his meter with him today, but he reports FBS's near goal and AC dinner in goal. He is currently dealing with an explained cough; Atrial Fib and fluid retention. He has been taken off of his antihyperglycemics to r/o any reactions to these therapies. He is struggling with meal plan changes and activity is limited, especially with fluid retention and fatigue. He is not interested in many low anupama/ carb food items.  He has downsized portions, but not improved choices much. He does agree to low impact exercise goal in the evening. Also discussed a ower carb meal plan while having to be off of his medication and having other health issues. He is being evaluated for heart and sleep apnea. Much encouragement given. Follow up this week with RD. Plan  Reviewed BS goals; carbs; exercise; medication actions  See if you an gt exercise sitting in your chair in the evening after supper              Arms and legs. Stay at or below heart level. Keep legs elevated as tolerated--not your foot rest, but maybe a             Shorted stool to prevent coughing  Try your acid reflux medication again 30 minute before eating  Limit packaged foods, salt, fried foods    Think about sticking closer to 35-45 grams carbohydrate per                 Meal to keep blood sugars in goal without th help of medication right now  Meter download, medications, PMH and nursing assessment reviewed. Fred Roxanna states He is willing to participate in this plan of care and verbalized understanding of all instructions provided. Teach back used to verify comprehension. Total time involved in direct patient education: 60 minutes.

## 2023-03-13 NOTE — PATIENT INSTRUCTIONS
See if you an gt exercise sitting in your chair in the evening after supper              Arms and legs. Stay at or below heart level.    Keep legs elevated as tolerated--not your foot rest, but maybe a             Shorted stool to prevent coughing  Try your acid reflux medication again 30 minute before eating  Limit packaged foods, salt, fried foods    Think about sticking closer to 35-45 grams carbohydrate per                 Meal to keep blood sugars in goal without th help of                Medication right now

## 2023-03-15 ENCOUNTER — OFFICE VISIT (OUTPATIENT)
Dept: INTERNAL MEDICINE CLINIC | Age: 43
End: 2023-03-15
Payer: COMMERCIAL

## 2023-03-15 VITALS — WEIGHT: 315 LBS | BODY MASS INDEX: 41.75 KG/M2 | HEIGHT: 73 IN

## 2023-03-15 DIAGNOSIS — E11.9 NEWLY DIAGNOSED DIABETES (HCC): Primary | ICD-10-CM

## 2023-03-15 PROCEDURE — 97803 MED NUTRITION INDIV SUBSEQ: CPT | Performed by: DIETITIAN, REGISTERED

## 2023-03-15 RX ORDER — FUROSEMIDE 20 MG/1
20 TABLET ORAL DAILY
COMMUNITY

## 2023-03-15 NOTE — PROGRESS NOTES
[address] [place] [address]. [address] [place] [address] [address] MarcoRod ToneyUniversity Hospitals [facility], [facility]  [phone] (phone)  [phone] (fax)    Patient Name: Meriel Bence. Date of Birth: [id number]. MRN: [id number]      Assessment: Patient is a 43 y.o. male seen for follow-up MNT visit for Type 2 DB.     -Nutritionally relevant labs:   Lab Results   Component Value Date/Time    LABA1C 9.1 (H) 01/02/2023 11:00 AM    GLUCOSE 115 (H) 01/09/2023 10:39 AM    GLUCOSE 162 (H) 01/04/2023 04:20 AM    CHOL 146 05/30/2016 04:32 AM    HDL 33 05/30/2016 04:32 AM    LDLCALC 91 05/30/2016 04:32 AM    TRIG 111 05/30/2016 04:32 AM     Pt her with wife Marcy Coles. Off DB meds d/t adverse reactions. Coughing in office. He also had swelling in lower legs - this is better.  -Blood sugar trends: forgot his meter. FBS:  120's - 130's. Late supper then 150 the next morning. When home from work - [address] recall:   Breakfast: tornado OR Babar - sausage patties with cheese and throws 1/2 the bread away. Coffee. Or diet pepsi. White cheddar smart pop popcorn OR \"Role Gold\" pretzels  Lunch: today - Vending machine food item - salad - chicken, cheese, [place], zero pepsi. Dinner: 530 - 730 pm - last night 7 pm - Cheesy chicken noodle casserole with velveeta cheese and noodles ~ 3-4 cups. 1 cup zero pop. Snacks: no evening snack. Has cut back on portions. Eating out 2x/week (not counting his brkf)  Not exercising - too cold to walk outside.    -Main Beverages: Denies drinking alcohol. Drinking 2-3 large cups (~30 oz/cup) of water/day. 2-3 diet pop zero.     -Impression of Dietary Intake: on average, 3 meals per day, on average, 7 fast food meals per week, high fat/ cholesterol, lacking routine intake of fruits and vegetables    Current Outpatient Medications on File Prior to Visit   Medication Sig Dispense Refill    LISINOPRIL-HYDROCHLOROTHIAZIDE PO Take by mouth      furosemide (LASIX) 20 MG tablet Take 20 mg by mouth daily      amiodarone (CORDARONE) 200 MG tablet Take 1 tablet by mouth daily 30 tablet 1    apixaban (ELIQUIS) 5 MG TABS tablet Take 1 tablet by mouth 2 times daily 60 tablet 2    dilTIAZem (CARDIZEM CD) 120 MG extended release capsule Take 1 capsule by mouth daily 30 capsule 3    glucose 4 g chewable tablet Take 4 tablets by mouth as needed for Low blood sugar 60 tablet 3    spironolactone (ALDACTONE) 25 MG tablet Take 25 mg by mouth daily      Elastic Bandages & Supports (ANKLE SPLINT/NIGHT AIRFORM) MISC 1 Device by Does not apply route every evening Plantar fasciitis of right foot  (primary encounter diagnosis)      Dispense posterior night splint. 1 each 0    metoprolol (TOPROL-XL) 100 MG XL tablet Take 100 mg by mouth 2 times daily      aspirin 81 MG tablet Take 81 mg by mouth daily      blood glucose monitor strips 1 strip by Other route 3 times daily Test 3 times a day & as needed for symptoms of irregular blood glucose. Dispense sufficient amount for indicated testing frequency plus additional to accommodate PRN testing needs. 90 strip 0    metFORMIN (GLUCOPHAGE) 500 MG tablet Take 1 tablet by mouth 2 times daily (with meals) (Patient not taking: No sig reported) 180 tablet 1    Dulaglutide (TRULICITY) 5.30 IZ/7.2YK SOPN Inject 0.75 mg into the skin once a week (Patient not taking: No sig reported) 4 Adjustable Dose Pre-filled Pen Syringe 0     No current facility-administered medications on file prior to visit. Vitals from current and previous visits:  Ht 6' 1\" (1.854 m)   Wt (!) 332 lb (150.6 kg)   BMI 43.80 kg/m²     -Body mass index is 43.8 kg/m². Greater than 40 - Morbid Obesity / Extreme Obesity / Grade III. - Patient maintained.  -Weight goal: lose weight. Nutrition Diagnosis:   Obesity & Food and nutrition-related knowledge deficit related to sedentary lifestyle, excessive energy intake and currently undergoing MNT as evidenced by Body mass index is 43.8 kg/m².  And food recall and patient report of not walking or exercising         Intervention:  -Impression: Pt does not like any vegetables except peas. Pt states he has a food log sheet at home.    -Instructed the patient on: Meal Planning for Regular, Balanced Meals & Snacks and The Importance of Regular Physical Activity, lowering carbs/meal to 45 gms and what that would like with regard to casseroles and then adding an extra lean protein portion to a meal when carb portions have to been cut back, and work in mixed veggies or other non-starchy veggies/salad with the meal as well. Check your BS at various times of the day - ex 2 hours after supper BS goal <180 better is <150/160    -Handouts given for: roasting vegetables, vary your veggies, lowfat mac and cheese recipe, chicken pasta with veggie recipe, cook and freeze    Patient Instructions   2 hour post meal BS check  2-3 x/week    Good job cutting back on your portions. Keep trying vegetables in new and different ways. Bring a one week food log to next dietitian appt. And bring meter.    -Nutrition prescription: 2000 calories/day, 180 g carbs/day. Lower carbs per meal ~ 45 gms carbs/meal  Wt. 221# (101 kg)    Comprehension verified using teachback method. Monitoring/Evaluation:   -Followup visit: 8 weeks with dietitian.   -Receptiveness to education/goals: Agreeable.  -Evaluation of education: Indicates understanding.  -Readiness to change: precontemplative- trying vegetables with meals, contemplative - getting active as able and with permission from your provider, action - continuing to cut back on portions  -Expected compliance: fair. Thank you for your referral of this patient. Total time involved in direct patient education: 30 minutes for follow-up MNT visit.

## 2023-03-15 NOTE — PATIENT INSTRUCTIONS
2 hour post meal BS check  2-3 x/week    Good job cutting back on your portions. Keep trying vegetables in new and different ways. Bring a one week food log to next dietitian appt. And bring meter.

## 2023-03-26 ENCOUNTER — HOSPITAL ENCOUNTER (OUTPATIENT)
Dept: SLEEP CENTER | Age: 43
Discharge: HOME | End: 2023-03-28
Payer: COMMERCIAL

## 2023-03-26 DIAGNOSIS — I48.19 PERSISTENT ATRIAL FIBRILLATION (HCC): ICD-10-CM

## 2023-03-26 DIAGNOSIS — G47.10 HYPERSOMNIA: ICD-10-CM

## 2023-03-26 DIAGNOSIS — E66.01 MORBID OBESITY WITH BMI OF 40.0-44.9, ADULT (HCC): ICD-10-CM

## 2023-03-26 DIAGNOSIS — R06.83 SNORING: ICD-10-CM

## 2023-03-26 PROCEDURE — 95811 POLYSOM 6/>YRS CPAP 4/> PARM: CPT

## 2023-03-27 LAB — STATUS: NORMAL

## 2023-03-28 ENCOUNTER — TELEPHONE (OUTPATIENT)
Dept: PULMONOLOGY | Age: 43
End: 2023-03-28

## 2023-03-28 NOTE — PROGRESS NOTES
[address] [place] [address]                               SLEEP STUDY REPORT    PATIENT NAME: Claudell Daily                      :        1980  MED REC NO:   [id number]                           ROOM:  ACCOUNT NO:   [de-identified]                           ADMIT DATE: 2023  PROVIDER:     Yuriy Cabrera. MD Zane    DATE OF STUDY:  2023    REFERRING PROVIDER:  Jose Luis Finley CNP    The patient's height is 73 inches, weight is 339 pounds with a BMI of  44.7. HISTORY:  The patient is a 41-year-old gentleman who was initially  evaluated by Jose Luis Finley CNP, on 2023. The patient is  currently suffering with hypersomnia with Blaine Sleepiness Score of  12. The patient had a class III Mallampati airway and neck  circumference of 21-1/2 inches. The patient is scheduled for  split-night sleep study to diagnose and treat sleep apnea. The patient  had a history of atrial fibrillation and congenital stenosis of the  aortic valve. METHODS:  The patient underwent digital polysomnography in compliance  with the standards and specifications from the AASM Manual including the  simultaneous recording of 3 EEG channels (F4-M1, C4-M1, and O2-M1 with  back up electrodes F3-M2, C3-M2, and O1-M2), 2 EOG channels (E1-M2, and  E2-M1,), EMG (chin, left & right leg), EKG, Nonin pulse oximetry with   less than 2 second averaging time, body position, airflow recorded by  oral-nasal thermal sensor and nasal air pressure transducer, plus  respiratory effort recorded by calibrated respiratory inductance  plethysmography (RIP), flow volume loop, sound and video. Sleep staging  and scoring followed the standard put forth by the American Academy of  Sleep Medicine and utilized the 1B obstructive hypopnea event  desaturation of 4 percent or greater.     INTERPRETATION:  This is a split-night sleep study which is going to be  dictated as

## 2023-03-28 NOTE — TELEPHONE ENCOUNTER
----- Message from ELENA Edmond CNP sent at 3/28/2023  1:39 PM EDT -----  Sleep study report reviewed, order for CPAP placed , please fax to DME of pt choice and make sure appropriate follow up is scheduled

## 2023-03-29 NOTE — TELEPHONE ENCOUNTER
Patient called in this morning and wanted to know about his sleep study. I let him know we did get the pap order faxed over because it did reveal that he was needing a pap to control his sleep disorder .  Would you like me to call patient back and explain some more details of the sleep study, or would you prefer to?

## 2023-03-30 NOTE — TELEPHONE ENCOUNTER
Called and reviewed sleep study with patient answering questions about his events, his index, and pressures during titration. Advised him with any questions regarding waiting for his machine he should contact [email or web address]. Patient verbalized understanding.

## 2023-04-04 RX ORDER — APIXABAN 5 MG/1
TABLET, FILM COATED ORAL
Qty: 60 TABLET | Refills: 0 | OUTPATIENT
Start: 2023-04-04

## 2023-04-10 RX ORDER — APIXABAN 5 MG/1
TABLET, FILM COATED ORAL
Qty: 60 TABLET | Refills: 0 | OUTPATIENT
Start: 2023-04-10

## 2023-05-03 LAB
AVERAGE GLUCOSE: 94
HBA1C MFR BLD: 4.9 %

## 2023-05-16 ENCOUNTER — OFFICE VISIT (OUTPATIENT)
Dept: INTERNAL MEDICINE CLINIC | Age: 43
End: 2023-05-16
Payer: COMMERCIAL

## 2023-05-16 VITALS
BODY MASS INDEX: 39.89 KG/M2 | SYSTOLIC BLOOD PRESSURE: 139 MMHG | HEIGHT: 74 IN | WEIGHT: 310.8 LBS | DIASTOLIC BLOOD PRESSURE: 65 MMHG

## 2023-05-16 VITALS
WEIGHT: 310.8 LBS | HEIGHT: 74 IN | DIASTOLIC BLOOD PRESSURE: 65 MMHG | BODY MASS INDEX: 39.89 KG/M2 | HEART RATE: 85 BPM | SYSTOLIC BLOOD PRESSURE: 139 MMHG | TEMPERATURE: 98.9 F

## 2023-05-16 DIAGNOSIS — E11.9 NEWLY DIAGNOSED DIABETES (HCC): Primary | ICD-10-CM

## 2023-05-16 DIAGNOSIS — E11.9 TYPE 2 DIABETES MELLITUS WITHOUT COMPLICATION, WITHOUT LONG-TERM CURRENT USE OF INSULIN (HCC): Primary | ICD-10-CM

## 2023-05-16 PROCEDURE — G0108 DIAB MANAGE TRN  PER INDIV: HCPCS | Performed by: REGISTERED NURSE

## 2023-05-16 PROCEDURE — 97803 MED NUTRITION INDIV SUBSEQ: CPT | Performed by: DIETITIAN, REGISTERED

## 2023-05-16 RX ORDER — HYDROCHLOROTHIAZIDE 25 MG/1
25 TABLET ORAL DAILY
COMMUNITY

## 2023-05-16 RX ORDER — OMEPRAZOLE 20 MG/1
20 CAPSULE, DELAYED RELEASE ORAL DAILY
COMMUNITY

## 2023-05-16 RX ORDER — LANOLIN ALCOHOL/MO/W.PET/CERES
325 CREAM (GRAM) TOPICAL 2 TIMES DAILY
COMMUNITY
End: 2023-05-16 | Stop reason: DRUGHIGH

## 2023-05-16 RX ORDER — DAPAGLIFLOZIN 10 MG/1
10 TABLET, FILM COATED ORAL DAILY
COMMUNITY
Start: 2023-04-20

## 2023-05-16 RX ORDER — LISINOPRIL 40 MG/1
20 TABLET ORAL DAILY
COMMUNITY

## 2023-05-16 RX ORDER — FERROUS SULFATE 325(65) MG
65 TABLET ORAL 2 TIMES DAILY
COMMUNITY
Start: 2023-04-17

## 2023-05-16 ASSESSMENT — PATIENT HEALTH QUESTIONNAIRE - PHQ9
2. FEELING DOWN, DEPRESSED OR HOPELESS: 0
1. LITTLE INTEREST OR PLEASURE IN DOING THINGS: 0
SUM OF ALL RESPONSES TO PHQ9 QUESTIONS 1 & 2: 0
SUM OF ALL RESPONSES TO PHQ QUESTIONS 1-9: 0

## 2023-05-16 NOTE — PROGRESS NOTES
[facility]  [address] [facility], [facility]  [phone] (phone)  [phone] (fax)    Patient ID: Anson Elliott 1980  Referring Provider: DAVID Walters CNP     Patient's name and  were verified. Subjective:    He presents for a follow-up diabetic visit. He has type 2 diabetes mellitus. Home regimen includes: Biguanide and SGLT-2 inhibitors He is compliant some of the time. Assessment:     Lab Results   Component Value Date/Time    LABA1C 9.1 2023 11:00 AM    BUN 14 2023 10:39 AM    CREATININE 1.2 2023 10:39 AM     Vitals:    23 1836   BP: 139/65   Site: Right Upper Arm   Position: Sitting   Pulse: 85   Temp: 98.9 °F (37.2 °C)   Weight: (!) 310 lb 12.8 oz (141 kg)   Height: 6' 2\" (1.88 m)     Wt Readings from Last 3 Encounters:   23 (!) 310 lb 12.8 oz (141 kg)   23 (!) 310 lb 12.8 oz (141 kg)   03/15/23 (!) 332 lb (150.6 kg)     Ht Readings from Last 3 Encounters:   23 6' 2\" (1.88 m)   23 6' 2\" (1.88 m)   03/15/23 6' 1\" (1.854 m)       Diabetes Pharmacotherapy:  Farxiga 10mg daily  Last dose of Trulicity 4188  Metformin 500mg BID    Glucose Trends:   Glucose at 4 hrs PPD today resulted at 149mg/dl  Current monitoring regimen: Fingerstick blood tests - not testing  Home blood sugar trends:    -not testing blood sugar  Any episodes of hypoglycemia? no   -Treats with na    Lifestyle Factors:   Previous visit with dietician: yes -   Current diet: reviewed with dietician today  Current exercise: ADL's-some walking around in the house; limited waiting on ablation    Health Maintenance:  Eye exam current (within one year): yes Date: 2023 [facility], [facility].  Any history of foot problems? no  Foot exam up to date: yes Date: 23, Pedal pulses:   peripheral pulses symmetrical   Results of monofilament test: 10/10              Skin noted to be warm and hair is absent.  General calluse on heels bilat  Immunizations up to date:    Pneumonia (dose

## 2023-05-16 NOTE — PROGRESS NOTES
[address] [place] [address]. [address] [place] [address] [address]., [facility], [address]  [phone] (phone)  [phone] (fax)    Patient Name: Gael Olivera. Date of Birth: [birth year]. MRN: [id number]      Assessment: Patient is a 37 y.o. male seen for follow-up MNT visit for Type 2 DB.     -Nutritionally relevant labs:   Lab Results   Component Value Date/Time    LABA1C 9.1 (H) 01/02/2023 11:00 AM    GLUCOSE 115 (H) 01/09/2023 10:39 AM    GLUCOSE 162 (H) 01/04/2023 04:20 AM    CHOL 146 05/30/2016 04:32 AM    HDL 33 05/30/2016 04:32 AM    LDLCALC 91 05/30/2016 04:32 AM    TRIG 111 05/30/2016 04:32 AM     -Blood sugar trends: Did not bring meter. Pt states he has not been checking. 4hrpp today 149  Pt off work d/t needing an ablasion, but then his Blood count was low (Low Hgb - 4/14/12 - labs Elevated CRP, Low Hgb/Hct, as well as other low iron). -Food recall:   Breakfast: 8 am - 2 sc eggs and 2 sl white bread, diet pepsi. Lunch: yesterday - ham and cheese sandwich. Dinner: last night - pizza - with cheese and pepperoni, 3 slices. Snacks: did not have a snack last night. Occ if wanting something sweet to eat, he will have 2 fun size candy bars. Eat out 3-4x/week.     -Main Beverages: Diet pepsi 2-3 - 16.9 oz bottles/day, drink 2- 32 oz mug of water    -Impression of Dietary Intake: on average, 3 meals per day, on average, 3-4 fast food meals per week, low fiber, high fat/ cholesterol, high salt, lacking routine intake of fruits and vegetables    Current Outpatient Medications on File Prior to Visit   Medication Sig Dispense Refill    ferrous sulfate (FE TABS 325) 325 (65 Fe) MG EC tablet Take 1 tablet by mouth 2 times daily Unsure of dosage      Dapagliflozin Propanediol (FARXIGA PO) Take 5 mg by mouth daily      omeprazole (PRILOSEC) 20 MG delayed release capsule Take 1 capsule by mouth daily      LISINOPRIL-HYDROCHLOROTHIAZIDE PO Take by mouth      apixaban (ELIQUIS) 5

## 2023-05-16 NOTE — PATIENT INSTRUCTIONS
Check BS daily.  - get in the routine of checking Fasting BS in the morning before Brkf. Good nutrition is very important for your health for many reasons.  - Balance your meals by including veggies and fresh fruit and/or have as snacks. Review the meal planning guidelines again. When getting take out or eating out - look for balance with including salads or vegetables. - Cut back on excess carbohydrates at one time - Ex. Eat less fries with your sandwich.  - Look for grilled menu options when available. Www.diabetes. org is a good website to go to for more diabetes information and meal planning tips and recipes.

## 2023-05-16 NOTE — PATIENT INSTRUCTIONS
Keep focusing on  your meal plan efforts       And your exercise efforts when you are able to again  Stay focused on keeping the weight off that you have lost  Check your blood sugar at least once weekly           Fasting (waking up) goal /130    (normal )        2hrs after eating a meal -goal is under 150/160 (normal under 140)  Any questions call the clinic

## 2023-07-07 ENCOUNTER — HOSPITAL ENCOUNTER (OUTPATIENT)
Age: 43
Discharge: HOME | End: 2023-07-07
Payer: COMMERCIAL

## 2023-07-07 LAB
ANION GAP SERPL CALC-SCNC: 12 MEQ/L (ref 8–16)
BASOPHILS ABSOLUTE: 0 THOU/MM3 (ref 0–0.1)
BASOPHILS NFR BLD AUTO: 0.5 %
BUN SERPL-MCNC: 17 MG/DL (ref 7–22)
CALCIUM SERPL-MCNC: 9.1 MG/DL (ref 8.5–10.5)
CHLORIDE SERPL-SCNC: 100 MEQ/L (ref 98–111)
CO2 SERPL-SCNC: 26 MEQ/L (ref 23–33)
CREAT SERPL-MCNC: 1.2 MG/DL (ref 0.4–1.2)
DEPRECATED RDW RBC AUTO: 50.7 FL (ref 35–45)
EOSINOPHIL NFR BLD AUTO: 5.4 %
EOSINOPHILS ABSOLUTE: 0.5 THOU/MM3 (ref 0–0.4)
ERYTHROCYTE [DISTWIDTH] IN BLOOD BY AUTOMATED COUNT: 16.3 % (ref 11.5–14.5)
FERRITIN SERPL IA-MCNC: 32 NG/ML (ref 22–322)
GFR SERPL CREATININE-BSD FRML MDRD: > 60 ML/MIN/1.73M2
GLUCOSE SERPL-MCNC: 100 MG/DL (ref 70–108)
HCT VFR BLD AUTO: 42.1 % (ref 42–52)
HGB BLD-MCNC: 12.5 GM/DL (ref 14–18)
IMM GRANULOCYTES # BLD AUTO: 0.04 THOU/MM3 (ref 0–0.07)
IMM GRANULOCYTES NFR BLD AUTO: 0.5 %
IRON SERPL-MCNC: 62 UG/DL (ref 65–195)
LYMPHOCYTES ABSOLUTE: 1 THOU/MM3 (ref 1–4.8)
LYMPHOCYTES NFR BLD AUTO: 11.2 %
MCH RBC QN AUTO: 25.6 PG (ref 26–33)
MCHC RBC AUTO-ENTMCNC: 29.7 GM/DL (ref 32.2–35.5)
MCV RBC AUTO: 86.1 FL (ref 80–94)
MONOCYTES ABSOLUTE: 0.6 THOU/MM3 (ref 0.4–1.3)
MONOCYTES NFR BLD AUTO: 6.8 %
NEUTROPHILS NFR BLD AUTO: 75.6 %
NRBC BLD AUTO-RTO: 0 /100 WBC
PLATELET # BLD AUTO: 238 THOU/MM3 (ref 130–400)
PMV BLD AUTO: 9.5 FL (ref 9.4–12.4)
POTASSIUM SERPL-SCNC: 4 MEQ/L (ref 3.5–5.2)
RBC # BLD AUTO: 4.89 MILL/MM3 (ref 4.7–6.1)
SEGMENTED NEUTROPHILS ABSOLUTE COUNT: 6.4 THOU/MM3 (ref 1.8–7.7)
SODIUM SERPL-SCNC: 138 MEQ/L (ref 135–145)
TIBC SERPL-MCNC: 334 UG/DL (ref 171–450)
WBC # BLD AUTO: 8.5 THOU/MM3 (ref 4.8–10.8)

## 2023-07-07 PROCEDURE — 85025 COMPLETE CBC W/AUTO DIFF WBC: CPT

## 2023-07-07 PROCEDURE — 83550 IRON BINDING TEST: CPT

## 2023-07-07 PROCEDURE — 36415 COLL VENOUS BLD VENIPUNCTURE: CPT

## 2023-07-07 PROCEDURE — 80048 BASIC METABOLIC PNL TOTAL CA: CPT

## 2023-07-07 PROCEDURE — 82728 ASSAY OF FERRITIN: CPT

## 2023-07-07 PROCEDURE — 83540 ASSAY OF IRON: CPT

## 2024-02-13 ENCOUNTER — TELEPHONE (OUTPATIENT)
Dept: PULMONOLOGY | Age: 44
End: 2024-02-13

## 2024-02-13 NOTE — TELEPHONE ENCOUNTER
Called patient and left message for him to call back and schedule follow up appointment in office. Can schedule with any XUAN. Please schedule appropriately once he calls back.

## 2025-03-19 ENCOUNTER — HOSPITAL ENCOUNTER (OUTPATIENT)
Age: 45
Discharge: HOME | End: 2025-03-19
Payer: COMMERCIAL

## 2025-03-19 PROCEDURE — 93005 ELECTROCARDIOGRAM TRACING: CPT

## 2025-03-20 LAB
EKG Q-T INTERVAL: 312 MS
EKG QRS DURATION: 148 MS
EKG QTC CALCULATION (BAZETT): 438 MS
EKG R AXIS: 90 DEGREES
EKG T AXIS: -69 DEGREES
EKG VENTRICULAR RATE: 119 BPM

## 2025-03-20 PROCEDURE — 93010 ELECTROCARDIOGRAM REPORT: CPT | Performed by: INTERNAL MEDICINE

## 2025-07-15 ENCOUNTER — HOSPITAL ENCOUNTER (OUTPATIENT)
Age: 45
Discharge: HOME | End: 2025-07-15
Payer: COMMERCIAL

## 2025-07-15 LAB
EKG Q-T INTERVAL: 344 MS
EKG QRS DURATION: 144 MS
EKG QTC CALCULATION (BAZETT): 536 MS
EKG R AXIS: 90 DEGREES
EKG T AXIS: 47 DEGREES
EKG VENTRICULAR RATE: 146 BPM

## 2025-07-15 PROCEDURE — 93005 ELECTROCARDIOGRAM TRACING: CPT | Performed by: NURSE PRACTITIONER

## 2025-07-15 PROCEDURE — 93010 ELECTROCARDIOGRAM REPORT: CPT | Performed by: NUCLEAR MEDICINE

## 2025-07-15 NOTE — PROGRESS NOTES
Outpt EKG complete. Pt asymptomatic to tachy arrhythmia. Reports that it is always abnormal and does not wish to stay for evaluation.  Faxed to [facility] and office notified of abnormal EKG.